# Patient Record
Sex: FEMALE | Race: WHITE | Employment: FULL TIME | URBAN - NONMETROPOLITAN AREA
[De-identification: names, ages, dates, MRNs, and addresses within clinical notes are randomized per-mention and may not be internally consistent; named-entity substitution may affect disease eponyms.]

---

## 2017-07-05 ENCOUNTER — TRANSFERRED RECORDS (OUTPATIENT)
Dept: HEALTH INFORMATION MANAGEMENT | Facility: HOSPITAL | Age: 37
End: 2017-07-05

## 2017-07-05 ENCOUNTER — HOSPITAL ENCOUNTER (INPATIENT)
Facility: HOSPITAL | Age: 37
LOS: 11 days | Discharge: HOME | DRG: 885 | End: 2017-07-16
Attending: PSYCHIATRY & NEUROLOGY | Admitting: PSYCHIATRY & NEUROLOGY
Payer: MEDICAID

## 2017-07-05 DIAGNOSIS — F43.10 PTSD (POST-TRAUMATIC STRESS DISORDER): Primary | ICD-10-CM

## 2017-07-05 LAB
ALT SERPL-CCNC: 17 U/L (ref 0–31)
AST SERPL-CCNC: 33 U/L (ref 0–31)
CREAT SERPL-MCNC: 0.6 MG/DL (ref 0.5–0.9)
GLUCOSE SERPL-MCNC: 90 MG/DL (ref 64–109)
POTASSIUM SERPL-SCNC: 4 MMOL/L (ref 3.5–5.1)
TSH SERPL-ACNC: 3.1 UIU/ML (ref 0.27–4)

## 2017-07-05 PROCEDURE — 12400000 ZZH R&B MH

## 2017-07-05 PROCEDURE — 25000132 ZZH RX MED GY IP 250 OP 250 PS 637: Performed by: NURSE PRACTITIONER

## 2017-07-05 RX ORDER — HYDROXYZINE HYDROCHLORIDE 25 MG/1
25-50 TABLET, FILM COATED ORAL EVERY 4 HOURS PRN
Status: DISCONTINUED | OUTPATIENT
Start: 2017-07-05 | End: 2017-07-09

## 2017-07-05 RX ORDER — OLANZAPINE 10 MG/2ML
10 INJECTION, POWDER, FOR SOLUTION INTRAMUSCULAR
Status: DISCONTINUED | OUTPATIENT
Start: 2017-07-05 | End: 2017-07-09

## 2017-07-05 RX ORDER — OLANZAPINE 10 MG/1
10 TABLET ORAL
Status: DISCONTINUED | OUTPATIENT
Start: 2017-07-05 | End: 2017-07-09

## 2017-07-05 RX ORDER — ACETAMINOPHEN 325 MG/1
650 TABLET ORAL EVERY 4 HOURS PRN
Status: DISCONTINUED | OUTPATIENT
Start: 2017-07-05 | End: 2017-07-16 | Stop reason: HOSPADM

## 2017-07-05 RX ORDER — DIAZEPAM 5 MG
5-20 TABLET ORAL EVERY 30 MIN PRN
Status: DISCONTINUED | OUTPATIENT
Start: 2017-07-05 | End: 2017-07-12

## 2017-07-05 RX ORDER — TRAZODONE HYDROCHLORIDE 50 MG/1
50 TABLET, FILM COATED ORAL
Status: DISCONTINUED | OUTPATIENT
Start: 2017-07-05 | End: 2017-07-10

## 2017-07-05 RX ORDER — HYDROXYZINE HYDROCHLORIDE 25 MG/1
TABLET, FILM COATED ORAL
Status: DISCONTINUED
Start: 2017-07-05 | End: 2017-07-08 | Stop reason: HOSPADM

## 2017-07-05 RX ADMIN — HYDROXYZINE HYDROCHLORIDE 50 MG: 25 TABLET ORAL at 23:23

## 2017-07-05 NOTE — IP AVS SNAPSHOT
[facility]    [address] [place] [address]    Phone:  [phone]    Fax:  [phone]                                       After Visit Summary   7/5/2017    Lorena Holden    MRN: [id number]           After Visit Summary Signature Page     I have received my discharge instructions, and my questions have been answered. I have discussed any challenges I see with this plan with the nurse or doctor.    ..........................................................................................................................................  Patient/Patient Representative Signature      ..........................................................................................................................................  Patient Representative Print Name and Relationship to Patient    ..................................................               ................................................  Date                                            Time    ..........................................................................................................................................  Reviewed by Signature/Title    ...................................................              ..............................................  Date                                                            Time

## 2017-07-05 NOTE — IP AVS SNAPSHOT
MRN:[id number]                      After Visit Summary   7/5/2017    Lorena Holden    MRN: [id number]           Thank you!     Thank you for choosing [facility] for your care. Our goal is always to provide you with excellent care. Hearing back from our patients is one way we can continue to improve our services. Please take a few minutes to complete the written survey that you may receive in the mail after you visit with us. Thank you!        Patient Information     Date Of Birth          1980        Designated Caregiver       Most Recent Value    Caregiver    Will someone help with your care after discharge? yes    Name of designated caregiver Lives with parents    Phone number of caregiver .    Caregiver address .      About your hospital stay     You were admitted on:  July 5, 2017 You last received care in the:  [facility]    You were discharged on:  July 16, 2017       Who to Call     For medical emergencies, please call 911.  For non-urgent questions about your medical care, please call your primary care provider or clinic, [phone]          Attending Provider     Provider Specialty    Asad Caban MD Psychiatry    Dee Dee Chu APRN CNP Nurse Practitioner       Primary Care Provider Office Phone # Fax #    Zenon Kennedy [phone]      Further instructions from your care team       Behavioral Discharge Planning and Instructions    Summary: Lorena was admitted for increased depression with suicidal ideation.    Main Diagnosis: Major depressive disorder, recurrent, severe, R/O Bipolar 2 disorder (family history), PTSD, Alcohol use disorder, moderate.     Major Treatments, Procedures and Findings: Stabilize with medications, connect with community programs.     Symptoms to Report: feeling more aggressive, increased confusion, losing more sleep, mood getting worse or thoughts of suicide    Lifestyle Adjustment: Take all medications as prescribed, meet  with doctor/ medication provider, out patient therapist, , and [facility] worker as scheduled. Abstain from alcohol or any unprescribed drugs.     Psychiatry Follow-up:     [facility] - referral sent 7/7/2017   Individual Therapy - July 19th, @ 1 pm.   [facility] - Therapist will give referral at appointment  Medication Management - Needs a primary doctor to give referral.  [address], [address]  Phone: [phone]  Fax: [phone]    [facility]   PCP - Zenon Schmidt - July 25th @ 10:20 (will need referral for psychiatry at the [facility])   Phone: [phone]  Fax: [phone]     [facility]   Rule 25 - August 1st @ 12:00 (two hour appointment)   [address] [address]   Phone: [phone]   Fax: [phone]    Resources:   Crisis Intervention: 136.359.2503 or 100-356-9251 (TTY: 113.293.1288).  Call anytime for help.  National [place] on Mental Illness (www.[place].araceli.org): 444.790.4087 or 223-438-0144.  Alcoholics Anonymous (www.alcoholics-anonymous.org): Check your phone book for your local chapter.  Suicide Awareness Voices of Education (SAVE) (www.save.org): 001-479-GBDJ (1183)  National Suicide Prevention Line (www.mentalhealthmn.org): 008-790-JZIF (0759)  Mental Health Consumer/Survivor Network of [place] (www.mhcsn.net): 662.257.7705 or 742-620-9487  Mental Health Association of [place] (www.mentalhealth.org): 598.889.2358 or 872-019-1919    General Medication Instructions:   See your medication sheet(s) for instructions.   Take all medicines as directed.  Make no changes unless your doctor suggests them.   Go to all your doctor visits.  Be sure to have all your required lab tests. This way, your medicines can be refilled on time.  Do not use any drugs not prescribed by your doctor.  Avoid alcohol.    [place] Area:  [facility] stabilization [facility]- [phone]  [facility] Crisis Line: 1-296.373.7258  Advocates For Family Peace:  "255-3060  Sexual Assault Program of [facility] Association: 471.378.4323 or 1-230.286.3050  [place] Battered Women's Program: 1-872.753.6600 Ext: 279       Calls answered Mon-Fri-8:00 am--4:30 pm    [place]:  Advocates for Family Peace: 1-565.117.8844  [facility] first call for help: 1-356.963.4447  [facility]:  (445) 445-9622      [place] Area:  Warm Line: 1-527.350.7247       Calls answered Tuesday--Saturday 4:00 pm--10:00 pm  [facility] Crisis Line - 272.114.4207  [place] [facility] 216-150-3211    [place] Statewide:  [place] Crisis and Referral Services: 1-512.520.2806  National Suicide Prevention Lifeline: 4-659-210-TALK (8532)   - byd7iyfc- Text  Life  to [address]  First Call for Help: 2-1-1  TEJIND[facility] Helpline- 5-804-NFIM-HELP     Pending Results     No orders found from 7/3/2017 to 7/6/2017.            Statement of Approval     Ordered          07/15/17 1050  I have reviewed and agree with all the recommendations and orders detailed in this document.  EFFECTIVE NOW     Approved and electronically signed by:  Dana Victoria NP             Admission Information     Date & Time Department Dept. Phone    7/5/2017 [facility] [phone]      Your Vitals Were     Blood Pressure Pulse Temperature Respirations Height Weight    120/77 65 97.5  F (36.4  C) (Tympanic) 12 1.702 m (5' 7\") 62.1 kg (137 lb)    Pulse Oximetry BMI (Body Mass Index)                98% 21.46 kg/m2          PlayEnable Information     PlayEnable lets you send messages to your doctor, view your test results, renew your prescriptions, schedule appointments and more. To sign up, go to [email or web address] . Click on \"Log in\" on the left side of the screen, which will take you to the Welcome page. Then click on \"Sign up Now\" on the right side of the page.     You will be asked to enter the access code listed below, as well as some personal information. Please follow the directions to create your username and " password.     Your access code is: [email or web address]  Expires: 10/13/2017 11:41 AM     Your access code will  in 90 days. If you need help or a new code, please call your [facility] clinic or [phone].        Care EveryWhere ID     This is your Care EveryWhere ID. This could be used by other organizations to access your [facility] medical records  [id number]        Equal Access to Services     ADRIÁN MITCHELL : Hadii virginia medina hadasho Soomaali, waaxda luqadaha, qaybta kaalmada adeegyada, riaz kaplan haymisaelmarissa paynemegansven felix . So [facility] [phone].    ATENCIÓN: Si habla espradha, tiene a daugherty disposición servicios gratuitos de asistencia lingüística. Llame al [phone].    We comply with applicable federal civil rights laws and [place] laws. We do not discriminate on the basis of race, color, national origin, age, disability sex, sexual orientation or gender identity.               Review of your medicines      START taking        Dose / Directions    busPIRone 5 MG tablet   Commonly known as:  BUSPAR   Used for:  PTSD (post-traumatic stress disorder)        Dose:  5 mg   Take 1 tablet (5 mg) by mouth 3 times daily   Quantity:  90 tablet   Refills:  0       DULoxetine 60 MG EC capsule   Commonly known as:  CYMBALTA   Used for:  PTSD (post-traumatic stress disorder)        Dose:  60 mg   Take 1 capsule (60 mg) by mouth daily   Quantity:  30 capsule   Refills:  0       hydrOXYzine 50 MG tablet   Commonly known as:  ATARAX   Used for:  PTSD (post-traumatic stress disorder)        Dose:  50 mg   Take 1 tablet (50 mg) by mouth 2 times daily   Quantity:  60 tablet   Refills:  0       naltrexone 50 MG tablet   Commonly known as:  DEPADE;REVIA   Used for:  PTSD (post-traumatic stress disorder)        Dose:  50 mg   Take 1 tablet (50 mg) by mouth daily   Quantity:  30 tablet   Refills:  0       prazosin 5 MG capsule   Commonly known as:  MINIPRESS   Used for:  PTSD (post-traumatic stress disorder)        Dose:  5 mg   Take  1 capsule (5 mg) by mouth At Bedtime   Quantity:  30 capsule   Refills:  0       traZODone 50 MG tablet   Commonly known as:  DESYREL   Used for:  PTSD (post-traumatic stress disorder)        Dose:   mg   Take 1-2 tablets ( mg) by mouth nightly as needed for sleep   Quantity:  60 tablet   Refills:  0         CONTINUE these medicines which have NOT CHANGED        Dose / Directions    fluticasone 50 MCG/ACT spray   Commonly known as:  FLONASE        Dose:  2 spray   Spray 2 sprays into both nostrils daily   Refills:  0       ondansetron 4 MG ODT tab   Commonly known as:  ZOFRAN-ODT   Used for:  Nausea & vomiting        Dose:  4-8 mg   Take 1-2 tablets (4-8 mg) by mouth every 8 hours as needed for nausea Dissolve ON the tongue.   Quantity:  4 tablet   Refills:  0       valACYclovir 500 MG tablet   Commonly known as:  VALTREX        Dose:  500 mg   Take 500 mg by mouth daily   Refills:  0         STOP taking     amphetamine-dextroamphetamine 20 MG per 24 hr capsule   Commonly known as:  ADDERALL XR           HYDROMORPHONE HCL PO           TEMAZEPAM PO                Where to get your medicines      These medications were sent to [facility] [address] [place] [address], [place] - [address] AT [facility] of [facility] [address]  [address], [facility] [address]     Phone:  [phone]     busPIRone 5 MG tablet    DULoxetine 60 MG EC capsule    hydrOXYzine 50 MG tablet    naltrexone 50 MG tablet    prazosin 5 MG capsule    traZODone 50 MG tablet                Protect others around you: Learn how to safely use, store and throw away your medicines at www.disposemymeds.org.             Medication List: This is a list of all your medications and when to take them. Check marks below indicate your daily home schedule. Keep this list as a reference.      Medications           Morning Afternoon Evening Bedtime As Needed    busPIRone 5 MG tablet   Commonly known as:  BUSPAR   Take 1 tablet (5 mg) by mouth 3 times daily   Last  time this was given:  5 mg on 7/16/2017 12:19 PM                                DULoxetine 60 MG EC capsule   Commonly known as:  CYMBALTA   Take 1 capsule (60 mg) by mouth daily   Last time this was given:  60 mg on 7/16/2017  8:26 AM                                fluticasone 50 MCG/ACT spray   Commonly known as:  FLONASE   Spray 2 sprays into both nostrils daily                                hydrOXYzine 50 MG tablet   Commonly known as:  ATARAX   Take 1 tablet (50 mg) by mouth 2 times daily   Last time this was given:  50 mg on 7/16/2017  8:27 AM                                naltrexone 50 MG tablet   Commonly known as:  DEPADE;REVIA   Take 1 tablet (50 mg) by mouth daily   Last time this was given:  50 mg on 7/16/2017  8:27 AM                                ondansetron 4 MG ODT tab   Commonly known as:  ZOFRAN-ODT   Take 1-2 tablets (4-8 mg) by mouth every 8 hours as needed for nausea Dissolve ON the tongue.   Last time this was given:  4 mg on 7/16/2017  7:52 AM                                prazosin 5 MG capsule   Commonly known as:  MINIPRESS   Take 1 capsule (5 mg) by mouth At Bedtime   Last time this was given:  5 mg on 7/15/2017  9:10 PM                                traZODone 50 MG tablet   Commonly known as:  DESYREL   Take 1-2 tablets ( mg) by mouth nightly as needed for sleep   Last time this was given:  100 mg on 7/15/2017  9:16 PM                                valACYclovir 500 MG tablet   Commonly known as:  VALTREX   Take 500 mg by mouth daily                                          More Information        Patient Education    Diazepam Oral solution    Diazepam Oral tablet    Diazepam Rectal gel    Diazepam Solution for injection  Diazepam Oral tablet  What is this medicine?  DIAZEPAM (dye AZ e virgilio) is a benzodiazepine. It is used to treat anxiety and nervousness. It also can help treat alcohol withdrawal, relax muscles, and treat certain types of seizures.  This medicine may be used for  other purposes; ask your health care provider or pharmacist if you have questions.  What should I tell my health care provider before I take this medicine?  They need to know if you have any of these conditions    an alcohol or drug abuse problem    bipolar disorder, depression, psychosis or other mental health condition    glaucoma    kidney or liver disease    lung or breathing disease    myasthenia gravis    Parkinson's disease    seizures or a history of seizures    suicidal thoughts    an unusual or allergic reaction to diazepam, other benzodiazepines, foods, dyes, or preservatives    pregnant or trying to get pregnant    breast-feeding  How should I use this medicine?  Take this medicine by mouth with a glass of water. Follow the directions on the prescription label. If this medicine upsets your stomach, take it with food or milk. Take your doses at regular intervals. Do not take your medicine more often than directed. If you have been taking this medicine regularly for some time, do not suddenly stop taking it. You must gradually reduce the dose or you may get severe side effects. Ask your doctor or health care professional for advice. Even after you stop taking this medicine it can still affect your body for several days.  Talk to your pediatrician regarding the use of this medicine in children. Special care may be needed.  Overdosage: If you think you have taken too much of this medicine contact a poison control center or emergency room at once.  NOTE: This medicine is only for you. Do not share this medicine with others.  What if I miss a dose?  If you miss a dose, take it as soon as you can. If it is almost time for your next dose, take only that dose. Do not take double or extra doses.  What may interact with this medicine?    cimetidine    grapefruit juice    herbal or dietary supplements like kava kava, melatonin, Wautec's Wort, or valerian    medicines for anxiety or sleeping problems, like  alprazolam, lorazepam, or triazolam    medicines for depression, mental problems or psychiatric disturbances    medicines for HIV infection or AIDS    prescription pain medicines    rifampin, rifapentine, or rifabutin    some medicines for seizures like carbamazepine, phenobarbital, phenytoin, or primidone  This list may not describe all possible interactions. Give your health care provider a list of all the medicines, herbs, non-prescription drugs, or dietary supplements you use. Also tell them if you smoke, drink alcohol, or use illegal drugs. Some items may interact with your medicine.  What should I watch for while using this medicine?  Visit your doctor or health care professional for regular checks on your progress. Your body can become dependent on this medicine. Ask your doctor or health care professional if you still need to take it.  You may get drowsy or dizzy. Do not drive, use machinery, or do anything that needs mental alertness until you know how this medicine affects you. To reduce the risk of dizzy and fainting spells, do not stand or sit up quickly, especially if you are an older patient. Alcohol may increase dizziness and drowsiness. Avoid alcoholic drinks.  Do not treat yourself for coughs, colds or allergies without asking your doctor or health care professional for advice. Some ingredients can increase possible side effects.  What side effects may I notice from receiving this medicine?  Side effects that you should report to your doctor or health care professional as soon as possible:    allergic reactions like skin rash, itching or hives, swelling of the face, lips, or tongue    angry, confused, depressed, other mood changes    breathing problems    feeling faint or lightheaded, falls    muscle cramps    problems with balance, talking, walking    restlessness    tremors    trouble passing urine or change in the amount of urine    unusually weak or tired  Side effects that usually do not  require medical attention (report to your doctor or health care professional if they continue or are bothersome):    difficulty sleeping, nightmares    dizziness, drowsiness, clumsiness, or unsteadiness, a hangover effect    headache    nausea, vomiting  This list may not describe all possible side effects. Call your doctor for medical advice about side effects. You may report side effects to FDA at 6-699-MJF-4799.  Where should I keep my medicine?  Keep out of the reach of children. This medicine can be abused. Keep your medicine in a safe place to protect it from theft. Do not share this medicine with anyone. Selling or giving away this medicine is dangerous and against the law.  Store at room temperature between 15 and 30 degrees C (59 and 86 degrees F). Protect from light. Keep container tightly closed. Throw away any unused medicine after the expiration date.  NOTE:This sheet is a summary. It may not cover all possible information. If you have questions about this medicine, talk to your doctor, pharmacist, or health care provider. Copyright  2016 [employer]                Buspirone tablets  What is this medicine?  BUSPIRONE (byoo HOANG mahane) is used to treat anxiety disorders.  How should I use this medicine?  Take this medicine by mouth with a glass of water. Follow the directions on the prescription label. You may take this medicine with or without food. To ensure that this medicine always works the same way for you, you should take it either always with or always without food. Take your doses at regular intervals. Do not take your medicine more often than directed. Do not stop taking except on the advice of your doctor or health care professional.  Talk to your pediatrician regarding the use of this medicine in children. Special care may be needed.  What side effects may I notice from receiving this medicine?  Side effects that you should report to your doctor or health care professional as soon as  possible:    blurred vision or other vision changes    chest pain    confusion    difficulty breathing    feelings of hostility or anger    muscle aches and pains    numbness or tingling in hands or feet    ringing in the ears    skin rash and itching    vomiting    weakness  Side effects that usually do not require medical attention (report to your doctor or health care professional if they continue or are bothersome):    disturbed dreams, nightmares    headache    nausea    restlessness or nervousness    sore throat and nasal congestion    stomach upset  What may interact with this medicine?  Do not take this medicine with any of the following medications:    linezolid    MAOIs like Carbex, Eldepryl, Marplan, Nardil, and Parnate    methylene blue    procarbazine  This medicine may also interact with the following medications:    diazepam    digoxin    diltiazem    erythromycin    grapefruit juice    haloperidol    medicines for mental depression or mood problems    medicines for seizures like carbamazepine, phenobarbital and phenytoin    nefazodone    other medications for anxiety    rifampin    ritonavir    some antifungal medicines like itraconazole, ketoconazole, and voriconazole    verapamil    warfarin  What if I miss a dose?  If you miss a dose, take it as soon as you can. If it is almost time for your next dose, take only that dose. Do not take double or extra doses.  Where should I keep my medicine?  Keep out of the reach of children.  Store at room temperature below 30 degrees C (86 degrees F). Protect from light. Keep container tightly closed. Throw away any unused medicine after the expiration date.  What should I tell my health care provider before I take this medicine?  They need to know if you have any of these conditions:    kidney or liver disease    an unusual or allergic reaction to buspirone, other medicines, foods, dyes, or preservatives    pregnant or trying to get  pregnant    breast-feeding  What should I watch for while using this medicine?  Visit your doctor or health care professional for regular checks on your progress. It may take 1 to 2 weeks before your anxiety gets better.  You may get drowsy or dizzy. Do not drive, use machinery, or do anything that needs mental alertness until you know how this drug affects you. Do not stand or sit up quickly, especially if you are an older patient. This reduces the risk of dizzy or fainting spells. Alcohol can make you more drowsy and dizzy. Avoid alcoholic drinks.  NOTE:This sheet is a summary. It may not cover all possible information. If you have questions about this medicine, talk to your doctor, pharmacist, or health care provider. Copyright  2017 [employer] Standard

## 2017-07-06 PROCEDURE — 12400000 ZZH R&B MH

## 2017-07-06 PROCEDURE — 25000132 ZZH RX MED GY IP 250 OP 250 PS 637: Performed by: NURSE PRACTITIONER

## 2017-07-06 PROCEDURE — 99223 1ST HOSP IP/OBS HIGH 75: CPT | Performed by: NURSE PRACTITIONER

## 2017-07-06 RX ORDER — ALBUTEROL SULFATE 90 UG/1
2 AEROSOL, METERED RESPIRATORY (INHALATION) EVERY 6 HOURS PRN
Status: DISCONTINUED | OUTPATIENT
Start: 2017-07-06 | End: 2017-07-16 | Stop reason: HOSPADM

## 2017-07-06 RX ORDER — DULOXETIN HYDROCHLORIDE 30 MG/1
30 CAPSULE, DELAYED RELEASE ORAL DAILY
Status: DISCONTINUED | OUTPATIENT
Start: 2017-07-07 | End: 2017-07-10

## 2017-07-06 RX ORDER — VALACYCLOVIR HYDROCHLORIDE 500 MG/1
500 TABLET, FILM COATED ORAL DAILY
COMMUNITY
End: 2018-03-15

## 2017-07-06 RX ORDER — METAXALONE 800 MG/1
800 TABLET ORAL 3 TIMES DAILY PRN
Status: DISCONTINUED | OUTPATIENT
Start: 2017-07-06 | End: 2017-07-16 | Stop reason: HOSPADM

## 2017-07-06 RX ORDER — IBUPROFEN 800 MG/1
800 TABLET, FILM COATED ORAL EVERY 6 HOURS PRN
Status: DISCONTINUED | OUTPATIENT
Start: 2017-07-06 | End: 2017-07-16 | Stop reason: HOSPADM

## 2017-07-06 RX ORDER — PRAZOSIN HYDROCHLORIDE 1 MG/1
1 CAPSULE ORAL AT BEDTIME
Status: DISCONTINUED | OUTPATIENT
Start: 2017-07-06 | End: 2017-07-07

## 2017-07-06 RX ORDER — DEXTROAMPHETAMINE SACCHARATE, AMPHETAMINE ASPARTATE MONOHYDRATE, DEXTROAMPHETAMINE SULFATE AND AMPHETAMINE SULFATE 5; 5; 5; 5 MG/1; MG/1; MG/1; MG/1
20 CAPSULE, EXTENDED RELEASE ORAL DAILY
Status: ON HOLD | COMMUNITY
End: 2017-07-15

## 2017-07-06 RX ORDER — NALTREXONE HYDROCHLORIDE 50 MG/1
50 TABLET, FILM COATED ORAL DAILY
Status: DISCONTINUED | OUTPATIENT
Start: 2017-07-06 | End: 2017-07-16 | Stop reason: HOSPADM

## 2017-07-06 RX ORDER — ONDANSETRON 4 MG/1
4 TABLET, ORALLY DISINTEGRATING ORAL EVERY 6 HOURS PRN
Status: DISCONTINUED | OUTPATIENT
Start: 2017-07-06 | End: 2017-07-16 | Stop reason: HOSPADM

## 2017-07-06 RX ADMIN — PRAZOSIN HYDROCHLORIDE 1 MG: 1 CAPSULE ORAL at 20:18

## 2017-07-06 RX ADMIN — DIAZEPAM 10 MG: 5 TABLET ORAL at 00:30

## 2017-07-06 RX ADMIN — IBUPROFEN 800 MG: 800 TABLET ORAL at 20:59

## 2017-07-06 RX ADMIN — OLANZAPINE 10 MG: 10 TABLET, FILM COATED ORAL at 22:15

## 2017-07-06 RX ADMIN — NALTREXONE HYDROCHLORIDE 50 MG: 50 TABLET, FILM COATED ORAL at 18:43

## 2017-07-06 RX ADMIN — DIAZEPAM 10 MG: 5 TABLET ORAL at 13:11

## 2017-07-06 RX ADMIN — DIAZEPAM 10 MG: 5 TABLET ORAL at 16:05

## 2017-07-06 RX ADMIN — HYDROXYZINE HYDROCHLORIDE 50 MG: 25 TABLET ORAL at 20:18

## 2017-07-06 RX ADMIN — DIAZEPAM 10 MG: 5 TABLET ORAL at 23:34

## 2017-07-06 ASSESSMENT — ACTIVITIES OF DAILY LIVING (ADL)
DRESS: 0-->INDEPENDENT
SWALLOWING: 0-->SWALLOWS FOODS/LIQUIDS WITHOUT DIFFICULTY
EATING: 0-->INDEPENDENT
DRESS: 0-->INDEPENDENT
ORAL_HYGIENE: INDEPENDENT
RETIRED_EATING: 0-->INDEPENDENT
PRIOR_FUNCTIONAL_LEVEL_COMMENT: NONE NOTED
COGNITION: 0 - NO COGNITION ISSUES REPORTED
SWALLOWING: 0-->SWALLOWS FOODS/LIQUIDS WITHOUT DIFFICULTY
TOILETING: 0-->INDEPENDENT
TRANSFERRING: 0-->INDEPENDENT
TRANSFERRING: 0-->INDEPENDENT
AMBULATION: 0-->INDEPENDENT
AMBULATION: 0-->INDEPENDENT
CURRENT_FUNCTIONAL_LEVEL_COMMENT: NONE NOTED
BATHING: 0-->INDEPENDENT
FALL_HISTORY_WITHIN_LAST_SIX_MONTHS: NO
BATHING: 0-->INDEPENDENT
DRESS: INDEPENDENT;SCRUBS (BEHAVIORAL HEALTH)
GROOMING: INDEPENDENT;SHOWER
CHANGE_IN_FUNCTIONAL_STATUS_SINCE_ONSET_OF_CURRENT_ILLNESS/INJURY: NO
COMMUNICATION: 0-->UNDERSTANDS/COMMUNICATES WITHOUT DIFFICULTY
RETIRED_COMMUNICATION: 0-->UNDERSTANDS/COMMUNICATES WITHOUT DIFFICULTY
LAUNDRY: UNABLE TO COMPLETE
TOILETING: 0-->INDEPENDENT
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
## 2017-07-06 NOTE — PLAN OF CARE
Face to face end of shift report received from Lilo LANZA RN. Rounding completed. Patient observed sitting in lounge area. No requests at this time.     Smiley Marquez  7/6/2017  12:21 AM

## 2017-07-06 NOTE — PLAN OF CARE
Problem: General Plan of Care (Inpatient Behavioral)  Goal: Patient Schedule  Patient s Schedule:   Pt has order for OT. Checked in with pt this afternoon and will plan to assess for OT tomorrow.

## 2017-07-06 NOTE — PLAN OF CARE
ADMISSION NOTE    Reason for admission: Suicidal Ideation and increased depression  Safety concerns: none  Risk for or history of violence: none   Full skin assessment: scar on lower mid back    Patient arrived on unit from [facility] accompanied by [place] EMT on 7/5/2017  10:35 PM.   Status on arrival: tearful, anxious, cooperative  /92  Pulse 83  Temp 98.9  F (37.2  C) (Tympanic)  Resp 22  SpO2 98%  Patient given tour of unit and Welcome to  unit papers given to patient, wanding completed, belongings inventoried, and admission assessment completed.   Patient's legal status on arrival is [place]. Appropriate legal rights discussed with and copy given to patient. Patient Bill of Rights discussed with and copy given to patient.   Patient denies SI, HI, and thoughts of self harm and contracts for safety while on unit.      Lilo La  7/5/2017  11:15 PM        Pt transported via ambulance on stretcher. Pt cooperative but anxious and tearful. Patient does have scattered tattoos throughout body and a scar on lower mid back from colon resection. Pt talks about depression and SI increasing throughout the past week. Pt states she is scared as she has never been in an inpatient behavioral health unit. Pt reports worsening nightmares and poor sleep. Pt states she drinks a large amount daily and becomes shaky each morning until she drinks again. Pt denies other drug use.

## 2017-07-06 NOTE — PLAN OF CARE
Problem: General Plan of Care (Inpatient Behavioral)  Goal: Team Discussion  Team Plan:   BEHAVIORAL TEAM DISCUSSION     Participants: Dana Victoria NP, Danae Lawton NP, [facility]as [facility], [facility], Sarita Jeffery [facility], Keturah Azul Discharge Planner, Salma Cifuentes RN, Donis Mcclendon RN, Kristan Loyd OT, Kimberlyn Bhat OT   Progress: None  Continued Stay Criteria/Rationale: Suicidal Ideation, minimal coping abilities, PTSD symptoms  Medical/Physical: Pain, monitor for ETOH withdrawal  Precautions:   Behavioral Orders   Procedures     Code 1 - Restrict to Unit     Routine Programming       As clinically indicated     Status 15       Every 15 minutes.     Plan: Restart medications. Safe alcohol withdrawal.   Rationale for change in precautions or plan: None

## 2017-07-06 NOTE — PLAN OF CARE
Problem: General Plan of Care (Inpatient Behavioral)  Goal: Individualization/Patient Specific Goal (IP Behavioral)  The patient and/or their representative will achieve their patient-specific goals related to the plan of care.    The patient-specific goals include:   Patient will verbalize a decrease in suicidal ideation prior to discharge.   Patient will contract for safety if having thoughts of self harm.   Patient will verbalize an acceptable level of pain while on unit.   Patient will have a safe withdrawal from alcohol.   Patient will comply with treatment team recommendations while on unit.   Patient sitting in lounge area at beginning of shift. Reports not eating all day. Meal brought up and patient did not eat any at this time. Slight tremors and sweating noted during conversation. MSSA score in beginning of shift was 12.   1230- Received PRN Valium 10 mg.   Patient laying in bed with eyes closed, non-labored breathing for remainder of shift. Position changes noted.  MSSA score at 0400 was 3 d/t patient sleeping.

## 2017-07-06 NOTE — PLAN OF CARE
Problem: General Plan of Care (Inpatient Behavioral)  Goal: Individualization/Patient Specific Goal (IP Behavioral)  The patient and/or their representative will achieve their patient-specific goals related to the plan of care.    The patient-specific goals include:   Patient will verbalize a decrease in suicidal ideation prior to discharge.   Patient will contract for safety if having thoughts of self harm.   Patient will verbalize an acceptable level of pain while on unit.   Patient will have a safe withdrawal from alcohol.   Patient will comply with treatment team recommendations while on unit.   Outcome: No Change  Pt contracts for safety. Pt isolates to her room, she is withdrawn. Pt appears to be irritable when talking with her. Pt reports pain in colon area due to what she states is her endometriosis. Pt declines tylenol stating that due to poor appetite and fear of upsetting her stomach. Pt is  Calm and cooperative she is asking for pain medications. Pt states that she received Percocet and morphine yesterday in the hospital and she reports this did help decrease her pain. Pt did talk with NP regarding pain.   Pt states she is feeling anxious like she is crawling out of her skin. Pt was administered 10mg of valium at 1605 for a MSSA score of 14.  Pt irritable and c/o pain frequently, and agitation with other patients. pt states the only thing that helps is percocet. Pt offered Tylenol again but refused, pt was administered Visterol 50mg for anxiety. Pt reported this was not helpful. Pt did get a roommate which she states makes her scared and upset she asks if there is any way she can have her own room. Pt was encouraged to use coping skills. Pt c/o and was administered ibuprofen at 2059 to assist with pain. Pt continues to have anxiety stating that a patient that is in the hallway singing and a roommate, and the patients being loud is increasing her anxiety pt states she is unable to cope. Pt administered  Zyprexa 10mg at 2215 for agitation, anxiety, and pain. Pt then c/o that after 20 minutes this medication made her feel like she was crawling out of her skin, pt states she just needs an alcoholic beverage and she feels like she is withdrawing. Pt was assessed and scored a 9 pt given 10mg of Valium at 2334. Pt is currently in bed with her eyes closed.

## 2017-07-06 NOTE — PLAN OF CARE
Face to face end of shift report received from LYSSA Reis. Rounding completed. Patient observed in room.      Donis Mcclendon  7/6/2017  7:30 AM
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
## 2017-07-06 NOTE — PLAN OF CARE
Face to face end of shift report received from Donis HOBSON RN. Rounding completed. Patient observed in room with staff.     Lilo La  7/6/2017  3:48 PM

## 2017-07-07 PROCEDURE — 25000132 ZZH RX MED GY IP 250 OP 250 PS 637: Performed by: NURSE PRACTITIONER

## 2017-07-07 PROCEDURE — 99232 SBSQ HOSP IP/OBS MODERATE 35: CPT | Performed by: NURSE PRACTITIONER

## 2017-07-07 PROCEDURE — 40000133 ZZH STATISTIC OT WARD VISIT

## 2017-07-07 PROCEDURE — 25000125 ZZHC RX 250: Performed by: NURSE PRACTITIONER

## 2017-07-07 PROCEDURE — 12400000 ZZH R&B MH

## 2017-07-07 PROCEDURE — 97165 OT EVAL LOW COMPLEX 30 MIN: CPT | Mod: GO

## 2017-07-07 RX ORDER — PRAZOSIN HYDROCHLORIDE 1 MG/1
2 CAPSULE ORAL AT BEDTIME
Status: DISCONTINUED | OUTPATIENT
Start: 2017-07-07 | End: 2017-07-10

## 2017-07-07 RX ADMIN — PRAZOSIN HYDROCHLORIDE 2 MG: 1 CAPSULE ORAL at 20:32

## 2017-07-07 RX ADMIN — ONDANSETRON 4 MG: 4 TABLET, ORALLY DISINTEGRATING ORAL at 13:05

## 2017-07-07 RX ADMIN — DULOXETINE HYDROCHLORIDE 30 MG: 30 CAPSULE, DELAYED RELEASE ORAL at 08:14

## 2017-07-07 RX ADMIN — NALTREXONE HYDROCHLORIDE 50 MG: 50 TABLET, FILM COATED ORAL at 08:14

## 2017-07-07 RX ADMIN — HYDROXYZINE HYDROCHLORIDE 50 MG: 25 TABLET ORAL at 17:00

## 2017-07-07 RX ADMIN — METAXALONE 800 MG: 800 TABLET ORAL at 11:50

## 2017-07-07 RX ADMIN — METAXALONE 800 MG: 800 TABLET ORAL at 20:32

## 2017-07-07 RX ADMIN — DIAZEPAM 10 MG: 5 TABLET ORAL at 08:14

## 2017-07-07 RX ADMIN — HYDROXYZINE HYDROCHLORIDE 50 MG: 25 TABLET ORAL at 11:50

## 2017-07-07 ASSESSMENT — ACTIVITIES OF DAILY LIVING (ADL)
LAUNDRY: UNABLE TO COMPLETE
GROOMING: INDEPENDENT
DRESS: SCRUBS (BEHAVIORAL HEALTH);INDEPENDENT
ORAL_HYGIENE: INDEPENDENT
DRESS: SCRUBS (BEHAVIORAL HEALTH);INDEPENDENT
LAUNDRY: UNABLE TO COMPLETE
GROOMING: INDEPENDENT
ORAL_HYGIENE: INDEPENDENT
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
## 2017-07-07 NOTE — PLAN OF CARE
Face to face end of shift report received from Alejandra ELLIOTT RN. Rounding completed. Patient observed in bed awake.     Sofia Johnson  7/7/2017  3:36 PM

## 2017-07-07 NOTE — PLAN OF CARE
Problem: General Plan of Care (Inpatient Behavioral)  Goal: Individualization/Patient Specific Goal (IP Behavioral)  The patient and/or their representative will achieve their patient-specific goals related to the plan of care.    The patient-specific goals include:   Patient will verbalize a decrease in suicidal ideation prior to discharge.   Patient will contract for safety if having thoughts of self harm.   Patient will verbalize an acceptable level of pain while on unit.   Patient will have a safe withdrawal from alcohol.   Patient will comply with treatment team recommendations while on unit.   Outcome: No Change  Patient appeared to be sleeping all night, eyes were closed with normal non labored respirations.  Position changes were noted.

## 2017-07-07 NOTE — PLAN OF CARE
"Problem: General Plan of Care (Inpatient Behavioral)  Goal: Individualization/Patient Specific Goal (IP Behavioral)  The patient and/or their representative will achieve their patient-specific goals related to the plan of care.    The patient-specific goals include:   Patient will verbalize a decrease in suicidal ideation prior to discharge.   Patient will contract for safety if having thoughts of self harm.   Patient will verbalize an acceptable level of pain while on unit.   Patient will have a safe withdrawal from alcohol.   Patient will comply with treatment team recommendations while on unit.   Pt cooperative with assessment. She denies SI, HI, and hallucinations. Pt endorses 8/10 pain in her \"colon\". Pt did receive Skelaxin earlier for pain and does appear to be resting. Admits to anxiety 8/10 and depression that has always been there.Pt states she struggles with social anxiety. Encouraged to attend groups. Pt has been resting in bed for most of this shift. Scored 4 on MSSA at 1600. Pt has been free from self harm this shift.  1700- Administered 50 mg Atarax for anxiety. Will continue to monitor.  2032-Skelaxin administered for 8/10 pain.            "

## 2017-07-07 NOTE — PROGRESS NOTES
"[facility]  Psychiatric Progress Note      Impression:   This is a 36 year old yo female with a history of depression, PTSD, and alcohol abuse.    Lorena is laying in bed when I go to see her today. She tells me that she is doing \"just okay today\". States that she has been having \"colon pain\", though states that this has been ongoing for several years. She reports that the pain went away this morning and she was able to attend groups. Yesterday she had asked for Percocet, stating that this is the only thing that would help. I did tell her that I would not be ordering any of the controlled substances that she had been taking prior to admission. She does understand this and does want to get into a treatment facility. She worries that if she discharges back to her parents place that she will just fall back into the same pattern of drinking. She has been going through some mild alcohol withdrawal since admission and has received Valium per [facility] protocol. She does state that she slept well last night. She has been tolerating the new medications, denies any notable side effects. Will be working with OT on coping skills and pain management.       Educated regarding medication indications, risks, benefits, side effects, contraindications and possible interactions. Verbally expressed understanding.        DIagnoses:   Major depressive disorder, recurrent, severe  R/O Bipolar 2 disorder (family history)  PTSD  Alcohol use disorder, moderate      Attestation:  Patient has been seen and evaluated by me,  Dana Victoria NP          Interim History:   The patient's care was discussed with the treatment team and chart notes were reviewed.          Medications:     Current Facility-Administered Medications Ordered in Epic   Medication Dose Route Frequency Last Rate Last Dose     prazosin (MINIPRESS) capsule 2 mg  2 mg Oral At Bedtime         albuterol (PROAIR HFA/PROVENTIL HFA/VENTOLIN HFA) Inhaler 2 puff  2 puff " "Inhalation Q6H PRN         naltrexone (DEPADE;REVIA) tablet 50 mg  50 mg Oral Daily   50 mg at 07/07/17 0814     DULoxetine (CYMBALTA) EC capsule 30 mg  30 mg Oral Daily   30 mg at 07/07/17 0814     metaxalone (SKELAXIN) tablet 800 mg  800 mg Oral TID PRN   800 mg at 07/07/17 1150     ibuprofen (ADVIL/MOTRIN) tablet 800 mg  800 mg Oral Q6H PRN   800 mg at 07/06/17 2059     ondansetron (ZOFRAN-ODT) ODT tab 4 mg  4 mg Oral Q6H PRN   4 mg at 07/07/17 1305     hydrOXYzine (ATARAX) tablet 25-50 mg  25-50 mg Oral Q4H PRN   50 mg at 07/07/17 1150     acetaminophen (TYLENOL) tablet 650 mg  650 mg Oral Q4H PRN         traZODone (DESYREL) tablet 50 mg  50 mg Oral At Bedtime PRN         OLANZapine (zyPREXA) tablet 10 mg  10 mg Oral Q2H PRN   10 mg at 07/06/17 2215    Or     OLANZapine (zyPREXA) injection 10 mg  10 mg Intramuscular Q2H PRN         nicotine polacrilex (NICORETTE) gum 2-4 mg  2-4 mg Buccal Q1H PRN         diazepam (VALIUM) tablet 5-20 mg  5-20 mg Oral Q30 Min PRN   10 mg at 07/07/17 0814     No current Epic-ordered outpatient prescriptions on file.              10 point ROS reviewed- reports \"colon\" pain       Allergies:     Allergies   Allergen Reactions     Compazine [Prochlorperazine] Anaphylaxis     Edisylate/Maleate     Bee Venom             Psychiatric Examination:   /80  Pulse 68  Temp 97.6  F (36.4  C) (Tympanic)  Resp 16  Ht 1.702 m (5' 7\")  Wt 63.2 kg (139 lb 4.8 oz)  SpO2 98%  BMI 21.82 kg/m2  Weight is 139 lbs 4.8 oz  Body mass index is 21.82 kg/(m^2).    Appearance:  awake, alert, adequately groomed and dressed in hospital scrubs  Attitude:  cooperative  Eye Contact:  good  Mood:  anxious  Affect:  appropriate and in normal range  Speech:  clear, coherent  Psychomotor Behavior:  no evidence of tardive dyskinesia, dystonia, or tics  Thought Process:  logical, linear and goal oriented  Associations:  no loose associations  Thought Content:  no evidence of suicidal ideation or homicidal " ideation, no evidence of psychotic thought and no auditory hallucinations present  Insight:  fair  Judgment:  fair  Oriented to:  time, person, and place  Attention Span and Concentration:  fair  Recent and Remote Memory:  fair  Fund of Knowledge: appropriate  Muscle Strength and Tone: normal  Gait and Station: Normal           Labs:   No results found for this or any previous visit (from the past 24 hour(s)).           Plan:   Increase Prazosin to 2 mg at bedtime  Monitor for alcohol withdrawal

## 2017-07-07 NOTE — PLAN OF CARE
Face to face end of shift report received from LYSSA Nagy. Rounding completed. Patient observed in room laying in bed.     Helen Willson  7/7/2017  12:40 AM

## 2017-07-07 NOTE — PLAN OF CARE
Face to face end of shift report received from Helen Willson RN. Rounding completed. Patient observed in bed. Eyes closed and non-labored respirations noted. Will continue to monitor.     Alejandra Bradshaw  7/7/2017  7:32 AM

## 2017-07-07 NOTE — PLAN OF CARE
Problem: General Plan of Care (Inpatient Behavioral)  Goal: Patient Schedule  Patient s Schedule:   OT sharee completed on this date.  SLUMS completed and pt scored 17/30 indicating dementia for less than high school education.  Pt does report that she often feels foggy and out of it, with decrease concentration and that the meds she is on right now is making her feel that way again.  Pt is wanting to go to treatment for alcohol use.  At this time, OT recommends pt get home med nurse and rep payee for finances.  Pt was issued aromatherapy for pain and anxiety, peppermint and lavender for inhalation. Will continue with OT 3xwk to address cognition, coping skills and pain management.

## 2017-07-07 NOTE — PLAN OF CARE
Problem: General Plan of Care (Inpatient Behavioral)  Goal: Team Discussion  Team Plan:   BEHAVIORAL TEAM DISCUSSION     Participants: Debbie Dunn NP, Dana Victoria NP, Danae Lawton NP, Keturah Azul Discharge Planner, Salma Cifuentes RN, Alejandra Bradshaw RN, Kimberlyn Bhat OT      Progress: active up in the morning  Continued Stay Criteria/Rationale: Med adjustments  Medical/Physical: endometriosis, monitoring for alcohol withdrawal  Precautions:   Behavioral Orders   Procedures     Code 1 - Restrict to Unit     Routine Programming       As clinically indicated     Status 15       Every 15 minutes.     Plan: continue to stabilize on meds, get set up with services for after discharge.  Rationale for change in precautions or plan: None

## 2017-07-07 NOTE — PLAN OF CARE
"Problem: General Plan of Care (Inpatient Behavioral)  Goal: Individualization/Patient Specific Goal (IP Behavioral)  The patient and/or their representative will achieve their patient-specific goals related to the plan of care.    The patient-specific goals include:   Patient will verbalize a decrease in suicidal ideation prior to discharge.   Patient will contract for safety if having thoughts of self harm.   Patient will verbalize an acceptable level of pain while on unit.   Patient will have a safe withdrawal from alcohol.   Patient will comply with treatment team recommendations while on unit.   Patient is cooperative with this writer during nursing assessment. Pt denies having suicidal ideation, homicidal ideation, or hallucinations. Pt does report having anxiety and depression. Pt reports that anxiety and depression is always there since she was young. Pt did take a shower this morning. Pt encouraged to attend groups. Pt did attend groups with some prompting even after endorsing she struggles with social anxiety. Pt scored an 9 on MSSA at 0815. Pt received PRN valium 10 mg PO for withdrawal symptoms. Pt reassessed at 1145. Pt scored a 4 on MSSA and did not qualify for valium PRN. Pt did request PRN atarax 50 mg PO for anxiety. Pt did report a decrease in anxiety upon reassessment. Pt also received PRN skelaxin 800 mg PO at 1150 for pain in colon. Pt rated pain an \"8\" on a 0-10 numeric scale. An absences of verbal cues of pain noted. Pt did report a decrease in pain also. Pt had no SIB thus far. Pt has no questions or concerns regarding plan of care. She is able to make needs known. Will continue to monitor.   1305: Pt has complaints of nausea. Pt requested PRN. PRN zofran 4 mg PO taken by patient. Pt did report absence of nausea upon reassessment.       "

## 2017-07-08 PROCEDURE — 12400000 ZZH R&B MH

## 2017-07-08 PROCEDURE — 25000132 ZZH RX MED GY IP 250 OP 250 PS 637: Performed by: NURSE PRACTITIONER

## 2017-07-08 PROCEDURE — 25000125 ZZHC RX 250: Performed by: NURSE PRACTITIONER

## 2017-07-08 PROCEDURE — 99232 SBSQ HOSP IP/OBS MODERATE 35: CPT | Performed by: NURSE PRACTITIONER

## 2017-07-08 RX ADMIN — ONDANSETRON 4 MG: 4 TABLET, ORALLY DISINTEGRATING ORAL at 05:58

## 2017-07-08 RX ADMIN — ONDANSETRON 4 MG: 4 TABLET, ORALLY DISINTEGRATING ORAL at 13:02

## 2017-07-08 RX ADMIN — OLANZAPINE 10 MG: 10 TABLET, FILM COATED ORAL at 15:18

## 2017-07-08 RX ADMIN — PRAZOSIN HYDROCHLORIDE 2 MG: 1 CAPSULE ORAL at 20:52

## 2017-07-08 RX ADMIN — OLANZAPINE 10 MG: 10 TABLET, FILM COATED ORAL at 22:13

## 2017-07-08 RX ADMIN — DIAZEPAM 10 MG: 5 TABLET ORAL at 13:00

## 2017-07-08 RX ADMIN — METAXALONE 800 MG: 800 TABLET ORAL at 16:18

## 2017-07-08 RX ADMIN — ACETAMINOPHEN 650 MG: 325 TABLET, FILM COATED ORAL at 13:30

## 2017-07-08 RX ADMIN — DIAZEPAM 10 MG: 5 TABLET ORAL at 08:31

## 2017-07-08 RX ADMIN — DIAZEPAM 5 MG: 5 TABLET ORAL at 10:03

## 2017-07-08 RX ADMIN — DULOXETINE HYDROCHLORIDE 30 MG: 30 CAPSULE, DELAYED RELEASE ORAL at 08:33

## 2017-07-08 RX ADMIN — NALTREXONE HYDROCHLORIDE 50 MG: 50 TABLET, FILM COATED ORAL at 08:33

## 2017-07-08 RX ADMIN — METAXALONE 800 MG: 800 TABLET ORAL at 05:52

## 2017-07-08 RX ADMIN — IBUPROFEN 800 MG: 800 TABLET ORAL at 22:13

## 2017-07-08 RX ADMIN — DIAZEPAM 10 MG: 5 TABLET ORAL at 16:18

## 2017-07-08 ASSESSMENT — ACTIVITIES OF DAILY LIVING (ADL)
ORAL_HYGIENE: INDEPENDENT
LAUNDRY: UNABLE TO COMPLETE
GROOMING: INDEPENDENT
DRESS: SCRUBS (BEHAVIORAL HEALTH);INDEPENDENT
DRESS: SCRUBS (BEHAVIORAL HEALTH);INDEPENDENT
GROOMING: INDEPENDENT
ORAL_HYGIENE: INDEPENDENT

## 2017-07-08 NOTE — PLAN OF CARE
Problem: General Plan of Care (Inpatient Behavioral)  Goal: Individualization/Patient Specific Goal (IP Behavioral)  The patient and/or their representative will achieve their patient-specific goals related to the plan of care.    The patient-specific goals include:   Patient will verbalize a decrease in suicidal ideation prior to discharge.   Patient will contract for safety if having thoughts of self harm.   Patient will verbalize an acceptable level of pain while on unit.   Patient will have a safe withdrawal from alcohol.   Patient will comply with treatment team recommendations while on unit.   Outcome: No Change  0600- Pt reported feeling nauseas and having pain. Pt received PRN Zofran at 0558  and Skelaxin at 0552. Pt scored a 5 on the MSSA at this time.

## 2017-07-08 NOTE — PLAN OF CARE
Face to face end of shift report received from MYLA Lowry RN. Rounding completed. Patient observed, resting in bed with eyes closed.     Girish Mancera  7/8/2017  7:54 AM

## 2017-07-08 NOTE — PLAN OF CARE
Face to face end of shift report received from Sofia GALICIA RN. Rounding completed. Patient observed resting in bed.     John Lowry  7/7/2017  11:47 PM

## 2017-07-08 NOTE — PLAN OF CARE
Face to face end of shift report received from Girish LANZA RN. Rounding completed. Patient observed in room.     Sofia Romobismark  7/8/2017  4:01 PM
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
## 2017-07-09 LAB — GLUCOSE BLDC GLUCOMTR-MCNC: 134 MG/DL (ref 70–99)

## 2017-07-09 PROCEDURE — 00000146 ZZHCL STATISTIC GLUCOSE BY METER IP

## 2017-07-09 PROCEDURE — 25000132 ZZH RX MED GY IP 250 OP 250 PS 637: Performed by: NURSE PRACTITIONER

## 2017-07-09 PROCEDURE — 12400000 ZZH R&B MH

## 2017-07-09 PROCEDURE — 99232 SBSQ HOSP IP/OBS MODERATE 35: CPT | Performed by: NURSE PRACTITIONER

## 2017-07-09 RX ORDER — HYDROXYZINE HYDROCHLORIDE 25 MG/1
50-100 TABLET, FILM COATED ORAL EVERY 4 HOURS PRN
Status: DISCONTINUED | OUTPATIENT
Start: 2017-07-09 | End: 2017-07-14

## 2017-07-09 RX ADMIN — PRAZOSIN HYDROCHLORIDE 2 MG: 1 CAPSULE ORAL at 20:46

## 2017-07-09 RX ADMIN — NALTREXONE HYDROCHLORIDE 50 MG: 50 TABLET, FILM COATED ORAL at 08:17

## 2017-07-09 RX ADMIN — HYDROXYZINE HYDROCHLORIDE 100 MG: 25 TABLET ORAL at 10:10

## 2017-07-09 RX ADMIN — IBUPROFEN 800 MG: 800 TABLET ORAL at 06:20

## 2017-07-09 RX ADMIN — HYDROXYZINE HYDROCHLORIDE 100 MG: 25 TABLET ORAL at 16:40

## 2017-07-09 RX ADMIN — NICOTINE POLACRILEX 4 MG: 4 GUM, CHEWING ORAL at 18:14

## 2017-07-09 RX ADMIN — DULOXETINE HYDROCHLORIDE 30 MG: 30 CAPSULE, DELAYED RELEASE ORAL at 08:17

## 2017-07-09 RX ADMIN — TRAZODONE HYDROCHLORIDE 50 MG: 50 TABLET ORAL at 22:26

## 2017-07-09 RX ADMIN — NICOTINE POLACRILEX 4 MG: 4 GUM, CHEWING ORAL at 13:36

## 2017-07-09 RX ADMIN — IBUPROFEN 800 MG: 800 TABLET ORAL at 16:40

## 2017-07-09 RX ADMIN — ACETAMINOPHEN 650 MG: 325 TABLET, FILM COATED ORAL at 01:03

## 2017-07-09 ASSESSMENT — ACTIVITIES OF DAILY LIVING (ADL)
GROOMING: INDEPENDENT
ORAL_HYGIENE: INDEPENDENT
DRESS: SCRUBS (BEHAVIORAL HEALTH);INDEPENDENT
LAUNDRY: UNABLE TO COMPLETE

## 2017-07-09 NOTE — PROGRESS NOTES
"[facility]  Psychiatric Progress Note      Impression:   This is a 36 year old yo female with a history of depression, PTSD, and alcohol abuse.    Lorena is in bed when I go to speak with her. She states that last night she got really dizzy and had to have her roommate help her to the floor. This happened shortly after taking a dose of Zyprexa. Her BP was low when checked at that time, though has returned to her normal range today. She has been eating poorly over the last few days due to going through alcohol withdrawal, which also likely contributed to her feelings of dizziness. She is also taking Prazosin, though had taken it the night before as well and did not experience these effects. He does agree to call for help if needed before getting up. She states that her anxiety is better than it was yesterday. Did ask for Valium to be scheduled, though is okay when told that this will only be used to help her get through her withdrawal. She is sleeping better at night. She denies any active suicidal ideation, though states that passive thoughts are \"always there\". Is hopeful to get into CD treatment soon.     Educated regarding medication indications, risks, benefits, side effects, contraindications and possible interactions. Verbally expressed understanding.        DIagnoses:   Major depressive disorder, recurrent, severe  R/O Bipolar 2 disorder (family history)  PTSD  Alcohol use disorder, moderate      Attestation:  Patient has been seen and evaluated by me,  Dana Victoria NP          Interim History:   The patient's care was discussed with the treatment team and chart notes were reviewed.          Medications:     Current Facility-Administered Medications Ordered in Epic   Medication Dose Route Frequency Last Rate Last Dose     prazosin (MINIPRESS) capsule 2 mg  2 mg Oral At Bedtime         albuterol (PROAIR HFA/PROVENTIL HFA/VENTOLIN HFA) Inhaler 2 puff  2 puff Inhalation Q6H PRN         naltrexone " "(DEPADE;REVIA) tablet 50 mg  50 mg Oral Daily   50 mg at 07/07/17 0814     DULoxetine (CYMBALTA) EC capsule 30 mg  30 mg Oral Daily   30 mg at 07/07/17 0814     metaxalone (SKELAXIN) tablet 800 mg  800 mg Oral TID PRN   800 mg at 07/07/17 1150     ibuprofen (ADVIL/MOTRIN) tablet 800 mg  800 mg Oral Q6H PRN   800 mg at 07/06/17 2059     ondansetron (ZOFRAN-ODT) ODT tab 4 mg  4 mg Oral Q6H PRN   4 mg at 07/07/17 1305     hydrOXYzine (ATARAX) tablet 25-50 mg  25-50 mg Oral Q4H PRN   50 mg at 07/07/17 1150     acetaminophen (TYLENOL) tablet 650 mg  650 mg Oral Q4H PRN         traZODone (DESYREL) tablet 50 mg  50 mg Oral At Bedtime PRN         OLANZapine (zyPREXA) tablet 10 mg  10 mg Oral Q2H PRN   10 mg at 07/06/17 2215    Or     OLANZapine (zyPREXA) injection 10 mg  10 mg Intramuscular Q2H PRN         nicotine polacrilex (NICORETTE) gum 2-4 mg  2-4 mg Buccal Q1H PRN         diazepam (VALIUM) tablet 5-20 mg  5-20 mg Oral Q30 Min PRN   10 mg at 07/07/17 0814     No current [facility]-ordered outpatient prescriptions on file.          10 point ROS reviewed- Alcohol withdrawal improving (nausea, chills, diaphoresis, poor appetite), occasional dizziness       Allergies:     Allergies   Allergen Reactions     Compazine [Prochlorperazine] Anaphylaxis     Edisylate/Maleate     Bee Venom             Psychiatric Examination:   /79  Pulse 74  Temp 99.2  F (37.3  C) (Tympanic)  Resp 15  Ht 1.702 m (5' 7\")  Wt 63.2 kg (139 lb 4.8 oz)  SpO2 96%  BMI 21.82 kg/m2  Weight is 139 lbs 4.8 oz  Body mass index is 21.82 kg/(m^2).    Appearance:  Awake, alert, dressed in hospital scrubs, casually groomed  Attitude: cooperative  Eye Contact:  good  Mood:  Anxiety has improved since yesterday though still present, reports depression  Affect: appears anxious, nervous  Speech:  clear, coherent  Psychomotor Behavior:  no evidence of tardive dyskinesia, dystonia, or tics  Thought Process:  logical, linear and goal oriented  Associations:  " no loose associations  Thought Content:  no evidence of suicidal ideation or homicidal ideation, no evidence of psychotic thought and no auditory hallucinations present  Insight:  fair  Judgment:  fair  Oriented to:  time, person, and place  Attention Span and Concentration:  fair  Recent and Remote Memory:  fair  Fund of Knowledge: appropriate  Muscle Strength and Tone: normal  Gait and Station: Normal           Labs:     Results for orders placed or performed during the hospital encounter of 07/05/17 (from the past 24 hour(s))   Glucose by meter   Result Value Ref Range    Glucose 134 (H) 70 - 99 mg/dL              Plan:   Continue to monitor alcohol withdrawal  Continue current medications  Will stop prn Zyprexa due to likely orthostatic hypotension

## 2017-07-09 NOTE — PLAN OF CARE
Face to face end of shift report received from pm RN. Rounding completed. Patient observed.     Prince Ford  7/9/2017  12:11 AM

## 2017-07-09 NOTE — PLAN OF CARE
Face to face end of shift report received from Joy HOBSON RN. Rounding completed. Patient observed in room.     Sofia Elizabeth  7/9/2017  3:51 PM

## 2017-07-09 NOTE — PLAN OF CARE
Problem: General Plan of Care (Inpatient Behavioral)  Goal: Individualization/Patient Specific Goal (IP Behavioral)  The patient and/or their representative will achieve their patient-specific goals related to the plan of care.    The patient-specific goals include:   Patient will verbalize a decrease in suicidal ideation prior to discharge.   Patient will contract for safety if having thoughts of self harm.   Patient will verbalize an acceptable level of pain while on unit.   Patient will have a safe withdrawal from alcohol.   Patient will comply with treatment team recommendations while on unit.   Patient in room most of the shift. Patient still complains that she feels dizzy at times when she stands. Patient does report high anxiety this morning. Patient was given 100 mg of atarax at 1010. Patientdid shower independatly this shift with a shower chair. Patient deneis SI, SIB, hallucinaitons, and delusions. Patient did contract for safety this shift.  Patient ate 50 % of her breakfast and is encouraged to continue to eat meals and snacks. Patient scored a 5 on MSSA this am and does not meet protocol for valium. Patient didn't report any pain this shift.
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
## 2017-07-09 NOTE — PLAN OF CARE
"Problem: General Plan of Care (Inpatient Behavioral)  Goal: Individualization/Patient Specific Goal (IP Behavioral)  The patient and/or their representative will achieve their patient-specific goals related to the plan of care.    The patient-specific goals include:   Patient will verbalize a decrease in suicidal ideation prior to discharge.   Patient will contract for safety if having thoughts of self harm.   Patient will verbalize an acceptable level of pain while on unit.   Patient will have a safe withdrawal from alcohol.   Patient will comply with treatment team recommendations while on unit.   Pt denies SI, HI, or hallucinations at this time. Pt reports high levels of anxiety 8/10 and endorses depression. She states that she feels a little better today. Pt has not reported any dizziness this shift. Gait is balanced and steady. Pt admits to pain 8/10 in her \"colon at this time. She has been out in the lounge several times this shift.    1630- Pt scored 4 on MSSA. No medications given per protocol.  1640- Pt given 100mg Atarax po for c/o high level of anxiety. Ibuprofen also given for pain.  2000-Pt scored a 2 on MSSA at this time. No medications per protocol.  2226- Pt given Trazadone for sleep.            "

## 2017-07-09 NOTE — PLAN OF CARE
Face to face end of shift report received from Sofia OMALLEY. Rounding completed. Patient observed in room sleeping.    Joy Trinidad  7/9/2017  7:36 AM

## 2017-07-10 PROCEDURE — 12400000 ZZH R&B MH

## 2017-07-10 PROCEDURE — 25000132 ZZH RX MED GY IP 250 OP 250 PS 637: Performed by: NURSE PRACTITIONER

## 2017-07-10 PROCEDURE — 99232 SBSQ HOSP IP/OBS MODERATE 35: CPT | Performed by: NURSE PRACTITIONER

## 2017-07-10 RX ORDER — PRAZOSIN HYDROCHLORIDE 5 MG/1
5 CAPSULE ORAL AT BEDTIME
Status: DISCONTINUED | OUTPATIENT
Start: 2017-07-10 | End: 2017-07-16 | Stop reason: HOSPADM

## 2017-07-10 RX ORDER — TRAZODONE HYDROCHLORIDE 50 MG/1
50-100 TABLET, FILM COATED ORAL
Status: DISCONTINUED | OUTPATIENT
Start: 2017-07-10 | End: 2017-07-16 | Stop reason: HOSPADM

## 2017-07-10 RX ORDER — DULOXETIN HYDROCHLORIDE 20 MG/1
40 CAPSULE, DELAYED RELEASE ORAL DAILY
Status: DISCONTINUED | OUTPATIENT
Start: 2017-07-11 | End: 2017-07-12

## 2017-07-10 RX ADMIN — HYDROXYZINE HYDROCHLORIDE 100 MG: 25 TABLET ORAL at 16:47

## 2017-07-10 RX ADMIN — DULOXETINE HYDROCHLORIDE 30 MG: 30 CAPSULE, DELAYED RELEASE ORAL at 08:28

## 2017-07-10 RX ADMIN — TRAZODONE HYDROCHLORIDE 100 MG: 50 TABLET ORAL at 21:35

## 2017-07-10 RX ADMIN — PRAZOSIN HYDROCHLORIDE 5 MG: 5 CAPSULE ORAL at 20:22

## 2017-07-10 RX ADMIN — DIAZEPAM 5 MG: 5 TABLET ORAL at 13:40

## 2017-07-10 RX ADMIN — NICOTINE POLACRILEX 4 MG: 4 GUM, CHEWING ORAL at 17:31

## 2017-07-10 RX ADMIN — NALTREXONE HYDROCHLORIDE 50 MG: 50 TABLET, FILM COATED ORAL at 08:28

## 2017-07-10 ASSESSMENT — ACTIVITIES OF DAILY LIVING (ADL)
GROOMING: INDEPENDENT
DRESS: SCRUBS (BEHAVIORAL HEALTH);INDEPENDENT
LAUNDRY: UNABLE TO COMPLETE
ORAL_HYGIENE: INDEPENDENT

## 2017-07-10 NOTE — PLAN OF CARE
Face to face end of shift report received from LYSSA Morrison. Rounding completed. Patient observed in room.      Donis Mcclendon  7/10/2017  8:00 AM

## 2017-07-10 NOTE — PLAN OF CARE
Problem: General Plan of Care (Inpatient Behavioral)  Goal: Team Discussion  Team Plan:   BEHAVIORAL TEAM DISCUSSION     Participants: Yanely Fernandez, TRS, Kimberlyn Bhat, OT, Salma Cifuentes, Supervisor, Debbie Dunn CNP, Danae Lawton CNP, Dana Victoria, WARREN, Sofia Vanegas, SW, Kristan Loyd, OT, Sarita Jeffery, SW, Mari Pedraza DCP, Jessica Hitchcock, SUKUMAR, Donis Mcclendon RN   Progress: minimal - anxious from triggers from PTSD   Continued Stay Criteria/Rationale: monitoring med adjustments, passive suicidal thoughts   Medical/Physical: endometriosis   Precautions:   Behavioral Orders   Procedures     Code 1 - Restrict to Unit     Routine Programming       As clinically indicated     Status 15       Every 15 minutes.     Plan: stabilize and discharge, set up resources   Rationale for change in precautions or plan: none

## 2017-07-10 NOTE — PLAN OF CARE
Face to face end of shift report received from pm RN. Rounding completed. Patient observed.     Prince Ford  7/9/2017  11:42 PM

## 2017-07-10 NOTE — PROGRESS NOTES
"[facility]  Psychiatric Progress Note      Impression:   This is a 36 year old yo female with a history of depression, PTSD, and alcohol abuse.    Lorena is in bed when I go to speak with her today. She states that she continues to experience nightmares every night and she wants them to go away. Discussed that we can try increase in Prazosin tonight, though will have to monitor BP. Also discussed needing to start therapy and continuing with it consistently as she had reported that every time it \"starts to get hard\" she would quit. She does not feel that she has been able to appropriately process her trauma and has been self-medicating with alcohol instead. Her alcohol withdrawal has improved, she has been able to eat a little more of her meals today. Has not attended groups today though states that she will try to go this evening. Reports continue depression and anxiety. Denies any notable side effects from medications.    Educated regarding medication indications, risks, benefits, side effects, contraindications and possible interactions. Verbally expressed understanding.        DIagnoses:   Major depressive disorder, recurrent, severe  R/O Bipolar 2 disorder (family history)  PTSD  Alcohol use disorder, moderate      Attestation:  Patient has been seen and evaluated by me,  Dana Victoria NP          Interim History:   The patient's care was discussed with the treatment team and chart notes were reviewed.          Medications:     Current Facility-Administered Medications Ordered in Epic   Medication Dose Route Frequency Last Rate Last Dose     prazosin (MINIPRESS) capsule 2 mg  2 mg Oral At Bedtime         albuterol (PROAIR HFA/PROVENTIL HFA/VENTOLIN HFA) Inhaler 2 puff  2 puff Inhalation Q6H PRN         naltrexone (DEPADE;REVIA) tablet 50 mg  50 mg Oral Daily   50 mg at 07/07/17 0814     DULoxetine (CYMBALTA) EC capsule 30 mg  30 mg Oral Daily   30 mg at 07/07/17 0814     metaxalone (SKELAXIN) " "tablet 800 mg  800 mg Oral TID PRN   800 mg at 07/07/17 1150     ibuprofen (ADVIL/MOTRIN) tablet 800 mg  800 mg Oral Q6H PRN   800 mg at 07/06/17 2059     ondansetron (ZOFRAN-ODT) ODT tab 4 mg  4 mg Oral Q6H PRN   4 mg at 07/07/17 1305     hydrOXYzine (ATARAX) tablet 25-50 mg  25-50 mg Oral Q4H PRN   50 mg at 07/07/17 1150     acetaminophen (TYLENOL) tablet 650 mg  650 mg Oral Q4H PRN         traZODone (DESYREL) tablet 50 mg  50 mg Oral At Bedtime PRN         OLANZapine (zyPREXA) tablet 10 mg  10 mg Oral Q2H PRN   10 mg at 07/06/17 2215    Or     OLANZapine (zyPREXA) injection 10 mg  10 mg Intramuscular Q2H PRN         nicotine polacrilex (NICORETTE) gum 2-4 mg  2-4 mg Buccal Q1H PRN         diazepam (VALIUM) tablet 5-20 mg  5-20 mg Oral Q30 Min PRN   10 mg at 07/07/17 0814     No current Epic-ordered outpatient prescriptions on file.          10 point ROS reviewed- mild ETOH withdrawal       Allergies:     Allergies   Allergen Reactions     Compazine [Prochlorperazine] Anaphylaxis     Edisylate/Maleate     Bee Venom             Psychiatric Examination:   /81  Pulse 80  Temp 99  F (37.2  C) (Tympanic)  Resp 17  Ht 1.702 m (5' 7\")  Wt 61.2 kg (134 lb 14.7 oz)  SpO2 97%  BMI 21.13 kg/m2  Weight is 134 lbs 14.74 oz  Body mass index is 21.13 kg/(m^2).    Appearance:  Awake, alert, dressed in hospital scrubs, casually groomed  Attitude: cooperative  Eye Contact: good  Mood:  Anxious and depressed though improved from admit  Affect: appears anxious, tearful today  Speech:  clear, coherent  Psychomotor Behavior:  no evidence of tardive dyskinesia, dystonia, or tics  Thought Process:  logical, linear and goal oriented  Associations:  no loose associations  Thought Content:  no evidence of suicidal ideation or homicidal ideation, no evidence of psychotic thought and no auditory hallucinations present  Insight:  fair  Judgment:  fair  Oriented to:  time, person, and place  Attention Span and Concentration:  " fair  Recent and Remote Memory:  fair  Fund of Knowledge: appropriate  Muscle Strength and Tone: normal  Gait and Station: Normal           Labs:     No results found for this or any previous visit (from the past 24 hour(s)).           Plan:   Continue to monitor alcohol withdrawal  Increase Prazosin to 5 mg at bedtime  Increase Cymbalta to 40 mg daily tomorrow

## 2017-07-10 NOTE — PLAN OF CARE
Face to face end of shift report received from Donis HOBSON RN. Rounding completed. Patient observed in room.     Sofia Romobismark  7/10/2017  3:51 PM

## 2017-07-10 NOTE — PLAN OF CARE
"Problem: General Plan of Care (Inpatient Behavioral)  Goal: Individualization/Patient Specific Goal (IP Behavioral)  The patient and/or their representative will achieve their patient-specific goals related to the plan of care.    The patient-specific goals include:   Patient will verbalize a decrease in suicidal ideation prior to discharge.   Patient will contract for safety if having thoughts of self harm.   Patient will verbalize an acceptable level of pain while on unit.   Patient will have a safe withdrawal from alcohol.   Patient will comply with treatment team recommendations while on unit.   She has been in bed this morning. She talks about being sweaty during the night. She is not visibly diaphoretic. She is wrapped up in several blankets and covers her head. She sleeps with ear plugs in and didn't hear when called for breakfast. Breakfast brought to her and she did eat 25%. She has no complaints of nausea. She says that she feels that she is \"through the alcohol withdrawal but I was doing adderall\". She feels that she is in adderall withdrawal. She has spent a lot of time in her bed. There is a call bell at her bedside and a wheelchair. She is encouraged to rise slowly when getting up. She is not making any complaints of pain at this time.   1230: sitting in room crying. She did not eat any lunch. She talks of constant nightmares, feelings of hopelessness, and worthlessness.   1340: patient scored 9 on MSSA scale and received 5 mg of valium.  1415: Patient is less tearful but continues to isolate in her room. She is given 2 apple juices as she did not eat lunch and this is what she requested.     "

## 2017-07-10 NOTE — PLAN OF CARE
"Problem: General Plan of Care (Inpatient Behavioral)  Goal: Individualization/Patient Specific Goal (IP Behavioral)  The patient and/or their representative will achieve their patient-specific goals related to the plan of care.    The patient-specific goals include:   Patient will verbalize a decrease in suicidal ideation prior to discharge.   Patient will contract for safety if having thoughts of self harm.   Patient will verbalize an acceptable level of pain while on unit.   Patient will have a safe withdrawal from alcohol.   Patient will comply with treatment team recommendations while on unit.   Pt reports having a \"bad\" day today. She states that she continues to have nightmares all night that are very vivid. She is teary upon talking to her about this. Pt encouraged to try to use some form of coping skills. She is sitting in room drawing at this time and this helps. Pt endorses anxiety and depression. She denies SI, HI, hallucinations and does not admit to pain at this time. Pt encouraged to continue to slow rise and has tap bell by bedside. Pt has been free from self harm this shift.  1647-100mg Atarax given for anxiety.  2135- administered Trazadone 100mg po for sleep.  Problem: Fall Risk (Adult)  Goal: Absence of Falls  Patient will be free from fall during hospital stay.  Pt will use tap bell as instructed.   Pt has been free from falls this shift.      "

## 2017-07-11 PROCEDURE — 97530 THERAPEUTIC ACTIVITIES: CPT | Mod: GO

## 2017-07-11 PROCEDURE — 12400000 ZZH R&B MH

## 2017-07-11 PROCEDURE — 25000132 ZZH RX MED GY IP 250 OP 250 PS 637: Performed by: NURSE PRACTITIONER

## 2017-07-11 PROCEDURE — 99232 SBSQ HOSP IP/OBS MODERATE 35: CPT | Performed by: NURSE PRACTITIONER

## 2017-07-11 PROCEDURE — 40000133 ZZH STATISTIC OT WARD VISIT

## 2017-07-11 RX ADMIN — TRAZODONE HYDROCHLORIDE 100 MG: 50 TABLET ORAL at 20:15

## 2017-07-11 RX ADMIN — NALTREXONE HYDROCHLORIDE 50 MG: 50 TABLET, FILM COATED ORAL at 08:45

## 2017-07-11 RX ADMIN — HYDROXYZINE HYDROCHLORIDE 100 MG: 25 TABLET ORAL at 16:28

## 2017-07-11 RX ADMIN — DULOXETINE HYDROCHLORIDE 40 MG: 20 CAPSULE, DELAYED RELEASE ORAL at 08:41

## 2017-07-11 RX ADMIN — HYDROXYZINE HYDROCHLORIDE 100 MG: 25 TABLET ORAL at 21:58

## 2017-07-11 RX ADMIN — DIAZEPAM 5 MG: 5 TABLET ORAL at 19:00

## 2017-07-11 RX ADMIN — PRAZOSIN HYDROCHLORIDE 5 MG: 5 CAPSULE ORAL at 20:15

## 2017-07-11 RX ADMIN — TRAZODONE HYDROCHLORIDE 50 MG: 50 TABLET ORAL at 21:58

## 2017-07-11 ASSESSMENT — ACTIVITIES OF DAILY LIVING (ADL)
DRESS: SCRUBS (BEHAVIORAL HEALTH)
DRESS: SCRUBS (BEHAVIORAL HEALTH);INDEPENDENT
GROOMING: INDEPENDENT
ORAL_HYGIENE: INDEPENDENT
LAUNDRY: UNABLE TO COMPLETE
GROOMING: INDEPENDENT
ORAL_HYGIENE: INDEPENDENT

## 2017-07-11 NOTE — PROGRESS NOTES
[facility]  Psychiatric Progress Note      Impression:   This is a 36 year old yo female with a history of depression, PTSD, and alcohol abuse.    Lorena has been up in the lounge and has been attending groups today. She states that she is feeling better. She reports that she slept well last night. Denies any notable side effects from medications. States that her parents will be coming to visit her tonight, which she is looking forward to. She reports continued anxiety, though feels that it has improved. She denies any suicidal ideation and her affect is brighter today.    Educated regarding medication indications, risks, benefits, side effects, contraindications and possible interactions. Verbally expressed understanding.        DIagnoses:   Major depressive disorder, recurrent, severe  R/O Bipolar 2 disorder (family history)  PTSD  Alcohol use disorder, moderate      Attestation:  Patient has been seen and evaluated by me,  Dana Victoria NP          Interim History:   The patient's care was discussed with the treatment team and chart notes were reviewed.          Medications:     Current Facility-Administered Medications Ordered in Epic   Medication Dose Route Frequency Last Rate Last Dose     prazosin (MINIPRESS) capsule 2 mg  2 mg Oral At Bedtime         albuterol (PROAIR HFA/PROVENTIL HFA/VENTOLIN HFA) Inhaler 2 puff  2 puff Inhalation Q6H PRN         naltrexone (DEPADE;REVIA) tablet 50 mg  50 mg Oral Daily   50 mg at 07/07/17 0814     DULoxetine (CYMBALTA) EC capsule 30 mg  30 mg Oral Daily   30 mg at 07/07/17 0814     metaxalone (SKELAXIN) tablet 800 mg  800 mg Oral TID PRN   800 mg at 07/07/17 1150     ibuprofen (ADVIL/MOTRIN) tablet 800 mg  800 mg Oral Q6H PRN   800 mg at 07/06/17 2059     ondansetron (ZOFRAN-ODT) ODT tab 4 mg  4 mg Oral Q6H PRN   4 mg at 07/07/17 1305     hydrOXYzine (ATARAX) tablet 25-50 mg  25-50 mg Oral Q4H PRN   50 mg at 07/07/17 1150     acetaminophen (TYLENOL) tablet  "650 mg  650 mg Oral Q4H PRN         traZODone (DESYREL) tablet 50 mg  50 mg Oral At Bedtime PRN         OLANZapine (zyPREXA) tablet 10 mg  10 mg Oral Q2H PRN   10 mg at 07/06/17 2215    Or     OLANZapine (zyPREXA) injection 10 mg  10 mg Intramuscular Q2H PRN         nicotine polacrilex (NICORETTE) gum 2-4 mg  2-4 mg Buccal Q1H PRN         diazepam (VALIUM) tablet 5-20 mg  5-20 mg Oral Q30 Min PRN   10 mg at 07/07/17 0814     No current Epic-ordered outpatient prescriptions on file.          10 point ROS reviewed- denying pain today       Allergies:     Allergies   Allergen Reactions     Compazine [Prochlorperazine] Anaphylaxis     Edisylate/Maleate     Bee Venom             Psychiatric Examination:   /70  Pulse 83  Temp 98.8  F (37.1  C) (Tympanic)  Resp 16  Ht 1.702 m (5' 7\")  Wt 61.2 kg (134 lb 14.7 oz)  SpO2 97%  BMI 21.13 kg/m2  Weight is 134 lbs 14.74 oz  Body mass index is 21.13 kg/(m^2).    Appearance:  Awake, alert, dressed in hospital scrubs, casually groomed  Attitude: cooperative  Eye Contact: good  Mood:  Improving, though still anxious and depressed  Affect: less anxious today  Speech:  clear, coherent  Psychomotor Behavior:  no evidence of tardive dyskinesia, dystonia, or tics  Thought Process:  logical, linear and goal oriented  Associations:  no loose associations  Thought Content:  no evidence of suicidal ideation or homicidal ideation, no evidence of psychotic thought and no auditory hallucinations present  Insight:  fair  Judgment:  fair  Oriented to:  time, person, and place  Attention Span and Concentration:  fair  Recent and Remote Memory:  fair  Fund of Knowledge: appropriate  Muscle Strength and Tone: normal  Gait and Station: Normal           Labs:     No results found for this or any previous visit (from the past 24 hour(s)).           Plan:   Continue medications  Continue to monitor alcohol withdrawal  Provide CD treatment resources, set-up with outpt MH services prior to " d/c

## 2017-07-11 NOTE — PLAN OF CARE
Problem: General Plan of Care (Inpatient Behavioral)  Goal: Team Discussion  Team Plan:   BEHAVIORAL TEAM DISCUSSION     Participants: Debbie Dunn NP, Dana Victoria NP, Danae Lawton NP, Sofia Lam [facility], Sarita Jeffery [facility], Mari Pedraza [facility], Salma Cifuentes RN, Raegan Dalal RN, Kimberlyn Bhat OT      Progress: sleeping well, improved mood  Continued Stay Criteria/Rationale: med adjustments  Medical/Physical: endometriosis  Precautions:   Behavioral Orders   Procedures     Code 1 - Restrict to Unit     Routine Programming       As clinically indicated     Status 15       Every 15 minutes.     Plan: continue to stabilize on meds, increased cymbalta, get insurance active to set outpatient services set up  Rationale for change in precautions or plan: None

## 2017-07-11 NOTE — PLAN OF CARE
Face to face end of shift report received from ronnie Black . Rounding completed. Patient observed. Lying in supine position - eyes closed - non-labored breathing noted.     Emily Oneill  7/11/2017  3:06 AM

## 2017-07-11 NOTE — PLAN OF CARE
"Problem: General Plan of Care (Inpatient Behavioral)  Goal: Individualization/Patient Specific Goal (IP Behavioral)  The patient and/or their representative will achieve their patient-specific goals related to the plan of care.    The patient-specific goals include:   Patient will verbalize a decrease in suicidal ideation prior to discharge.   Patient will contract for safety if having thoughts of self harm.   Patient will verbalize an acceptable level of pain while on unit.   Patient will have a safe withdrawal from alcohol.   Patient will comply with treatment team recommendations while on unit.   Outcome: No Change  Pt spent much of the shift in her room, resting. Has been up on the unit for meals and is attending group this afternoon, however. Pleasant and cooperative on assessment. Acknowledges continued feelings of \"hopelessness,\" depression, and anxiety--but did deny SI. Did eat 50% of breakfast and lunch meals. Minimal signs/symptoms of ETOH withdrawal present--with pt scoring a '7' on the MSSA tool this shift. No PRN Valium given. Pt did tell this writer that she slept \"good\" last night. Reported \"really weird, vivid dreams,\" but denied nightmares. Has remained free from self-harm and/or injury this shift.     Problem: Fall Risk (Adult)  Goal: Absence of Falls  Patient will be free from fall during hospital stay.  Pt will use tap bell as instructed.   Outcome: No Change  Pt has remained free from falls and/or injury this shift. Tap bell and wheelchair remain at bedside.      "

## 2017-07-11 NOTE — DISCHARGE INSTRUCTIONS
Behavioral Discharge Planning and Instructions    Summary: Lorena was admitted for increased depression with suicidal ideation.    Main Diagnosis: Major depressive disorder, recurrent, severe, R/O Bipolar 2 disorder (family history), PTSD, Alcohol use disorder, moderate.     Major Treatments, Procedures and Findings: Stabilize with medications, connect with community programs.     Symptoms to Report: feeling more aggressive, increased confusion, losing more sleep, mood getting worse or thoughts of suicide    Lifestyle Adjustment: Take all medications as prescribed, meet with doctor/ medication provider, out patient therapist, , and [facility] worker as scheduled. Abstain from alcohol or any unprescribed drugs.     Psychiatry Follow-up:     [place] [facility] - referral sent 7/7/2017   Individual Therapy - July 19th, @ 1 pm.   ARM - Therapist will give referral at appointment  Medication Management - Needs a primary doctor to give referral.  [address] [address]  Phone: [phone]  Fax: [phone]    [facility]   PCP - Zenon Schmidt - July 25th @ 10:20 (will need referral for psychiatry at the [facility])   Phone: [phone]  Fax: [phone]     [facility]   Rule 25 - August 1st @ 12:00 (two hour appointment)   [address] [place] [address]   Phone: [phone]   Fax: [phone]    Resources:   Crisis Intervention: 976.811.5298 or 239-322-2137 (TTY: 236.245.4504).  Call anytime for help.  [place] on Mental Illness (www.[place].araceli.org): 116.953.3715 or 112-613-7574.  Alcoholics Anonymous (www.alcoholics-anonymous.org): Check your phone book for your local chapter.  Suicide Awareness Voices of Education (SAVE) (www.save.org): 074-016-BINX (2945)  National Suicide Prevention Line (www.mentalhealthmn.org): 789-019-FOEO (1920)  Mental Health Consumer/Survivor Network of [place] (www.mhcsn.net): 429.700.3119 or 024-486-1158  Mental Health  Association of [place] (www.mentalhealth.org): 793.259.3891 or 033-509-0926    General Medication Instructions:   See your medication sheet(s) for instructions.   Take all medicines as directed.  Make no changes unless your doctor suggests them.   Go to all your doctor visits.  Be sure to have all your required lab tests. This way, your medicines can be refilled on time.  Do not use any drugs not prescribed by your doctor.  Avoid alcohol.    [place] Area:  [facility], Crisis stabilization \A Chronology of [place] Hospitals\""- [phone]  [facility] Crisis Line: 1-651.543.9704  Advocates For Family Peace: 696-6862  Sexual Assault Program [facility]: [phone] or 1-321.824.2989  [place] Battered Women's Program: 1-683.105.2222 Ext: 279       Calls answered Mon-Fri-8:00 am--4:30 pm    [place]:  Advocates for Family Peace: 1-710.173.7212  [facility] first call for help: 6-174-849-2761  [facility] Counseling Crisis Center:  ([phone]      [place] Area:  Warm Line: 1-484.605.3580       Calls answered Tuesday--Saturday 4:00 pm--10:00 pm  [facility] Crisis Line - [phone]  [place] [facility] 483-382-2337    [place] Statewide:  [place] Crisis and Referral Services: 1-621.296.4501  National Suicide Prevention Lifeline: 2-938-618-TALK (2441)   - etc6jwrs- Text  Life  to 39197  First Call for Help: 2-1-1  TEJINDER Helpline- 7-822-UXRQ-HELP

## 2017-07-11 NOTE — PLAN OF CARE
Problem: General Plan of Care (Inpatient Behavioral)  Goal: Individualization/Patient Specific Goal (IP Behavioral)  The patient and/or their representative will achieve their patient-specific goals related to the plan of care.    The patient-specific goals include:   Patient will verbalize a decrease in suicidal ideation prior to discharge.   Patient will contract for safety if having thoughts of self harm.   Patient will verbalize an acceptable level of pain while on unit.   Patient will have a safe withdrawal from alcohol.   Patient will comply with treatment team recommendations while on unit.   Outcome: No Change  Slept all noc without issue.

## 2017-07-11 NOTE — PLAN OF CARE
Problem: General Plan of Care (Inpatient Behavioral)  Goal: Patient Schedule  Patient s Schedule:   Pt issued handout on coping, calming, alerting and stress management.  Pt guided through worksheet on calming verses alerting sensory activities.  Pt identified tasks for each sense that she finds helpful for calming and coping.  Pt did state that she likes the lavender essential oil she was given and found it beneficial for relaxation when she was trying to fall asleep. States that she is happy to be here and is feeling a bit better and slightly more hopeful.  States she is willing and eager to go to treatment after.

## 2017-07-11 NOTE — PLAN OF CARE
Face to face end of shift report received from Effie FELTON RN. Rounding completed. [place] [facility]     Paula Rogers  7/11/2017  3:57 PM

## 2017-07-12 PROCEDURE — 12400000 ZZH R&B MH

## 2017-07-12 PROCEDURE — 99232 SBSQ HOSP IP/OBS MODERATE 35: CPT | Performed by: NURSE PRACTITIONER

## 2017-07-12 PROCEDURE — 25000125 ZZHC RX 250: Performed by: NURSE PRACTITIONER

## 2017-07-12 PROCEDURE — 25000132 ZZH RX MED GY IP 250 OP 250 PS 637: Performed by: NURSE PRACTITIONER

## 2017-07-12 RX ORDER — BUSPIRONE HYDROCHLORIDE 5 MG/1
5 TABLET ORAL 3 TIMES DAILY
Status: DISCONTINUED | OUTPATIENT
Start: 2017-07-12 | End: 2017-07-16 | Stop reason: HOSPADM

## 2017-07-12 RX ORDER — DULOXETIN HYDROCHLORIDE 60 MG/1
60 CAPSULE, DELAYED RELEASE ORAL DAILY
Status: DISCONTINUED | OUTPATIENT
Start: 2017-07-13 | End: 2017-07-16 | Stop reason: HOSPADM

## 2017-07-12 RX ORDER — OLANZAPINE 2.5 MG/1
2.5 TABLET, FILM COATED ORAL 2 TIMES DAILY PRN
Status: DISCONTINUED | OUTPATIENT
Start: 2017-07-12 | End: 2017-07-14 | Stop reason: DRUGHIGH

## 2017-07-12 RX ADMIN — ONDANSETRON 4 MG: 4 TABLET, ORALLY DISINTEGRATING ORAL at 08:55

## 2017-07-12 RX ADMIN — BUSPIRONE HYDROCHLORIDE 5 MG: 5 TABLET ORAL at 13:01

## 2017-07-12 RX ADMIN — TRAZODONE HYDROCHLORIDE 50 MG: 50 TABLET ORAL at 23:46

## 2017-07-12 RX ADMIN — HYDROXYZINE HYDROCHLORIDE 100 MG: 25 TABLET ORAL at 23:46

## 2017-07-12 RX ADMIN — IBUPROFEN 800 MG: 800 TABLET ORAL at 13:01

## 2017-07-12 RX ADMIN — OLANZAPINE 2.5 MG: 2.5 TABLET, FILM COATED ORAL at 13:01

## 2017-07-12 RX ADMIN — DULOXETINE HYDROCHLORIDE 40 MG: 20 CAPSULE, DELAYED RELEASE ORAL at 08:55

## 2017-07-12 RX ADMIN — PRAZOSIN HYDROCHLORIDE 5 MG: 5 CAPSULE ORAL at 21:01

## 2017-07-12 RX ADMIN — IBUPROFEN 800 MG: 800 TABLET ORAL at 21:01

## 2017-07-12 RX ADMIN — NALTREXONE HYDROCHLORIDE 50 MG: 50 TABLET, FILM COATED ORAL at 08:55

## 2017-07-12 RX ADMIN — METAXALONE 800 MG: 800 TABLET ORAL at 10:39

## 2017-07-12 RX ADMIN — ACETAMINOPHEN 650 MG: 325 TABLET, FILM COATED ORAL at 18:22

## 2017-07-12 RX ADMIN — TRAZODONE HYDROCHLORIDE 100 MG: 50 TABLET ORAL at 21:04

## 2017-07-12 RX ADMIN — BUSPIRONE HYDROCHLORIDE 5 MG: 5 TABLET ORAL at 21:01

## 2017-07-12 RX ADMIN — HYDROXYZINE HYDROCHLORIDE 100 MG: 25 TABLET ORAL at 18:22

## 2017-07-12 RX ADMIN — ACETAMINOPHEN 650 MG: 325 TABLET, FILM COATED ORAL at 09:04

## 2017-07-12 ASSESSMENT — ACTIVITIES OF DAILY LIVING (ADL)
LAUNDRY: UNABLE TO COMPLETE
DRESS: SCRUBS (BEHAVIORAL HEALTH);INDEPENDENT
GROOMING: INDEPENDENT
ORAL_HYGIENE: INDEPENDENT
GROOMING: INDEPENDENT
DRESS: SCRUBS (BEHAVIORAL HEALTH)
ORAL_HYGIENE: INDEPENDENT

## 2017-07-12 NOTE — PLAN OF CARE
Face to face end of shift report received from Janette FELTON RN. Rounding completed. Patient observed in room.     Paula Rogers  7/12/2017  5:27 PM

## 2017-07-12 NOTE — PLAN OF CARE
Problem: General Plan of Care (Inpatient Behavioral)  Goal: Team Discussion  Team Plan:   BEHAVIORAL TEAM DISCUSSION     Participants: Debbie Dunn NP, Danae Lawton NP,Sofia Lam LICSW, Unique Villegas LICSW, Anastasiya Hitchcock LSW, Sarita Jeffery LICSW, Mari Pedraza Discharge Plannner, Salma Cifuentes RN, Raegan Dalal RN, Yanely Larose [facility], Kristan Loyd OT, Kimberlyn Bhat OT      Progress: fair  Continued Stay Criteria/Rationale: continue to adjust meds  Medical/Physical: endometriosis  Precautions:   Behavioral Orders   Procedures     Code 1 - Restrict to Unit     Routine Programming       As clinically indicated     Status 15       Every 15 minutes.     Plan: stabilize on meds and then discharge back to her previous living situation with numerous services in place  Rationale for change in precautions or plan: None

## 2017-07-12 NOTE — PLAN OF CARE
"Problem: General Plan of Care (Inpatient Behavioral)  Goal: Individualization/Patient Specific Goal (IP Behavioral)  The patient and/or their representative will achieve their patient-specific goals related to the plan of care.    The patient-specific goals include:   Patient will verbalize a decrease in suicidal ideation prior to discharge.   Patient will contract for safety if having thoughts of self harm.   Patient will verbalize an acceptable level of pain while on unit.   Patient will have a safe withdrawal from alcohol.   Patient will comply with treatment team recommendations while on unit.   Outcome: No Change  Patient cooperative with assessment questioning and medication administration. Denies SI, HI, depression, and hallucinations. Admits to anxiety being \"very high\" and pain 9/10 \"in my colon from my endometriosis\". States she needs to have surgery again, has had two prior surgeries related to her endometriosis, but is scared to have the third. States her sister suffers with this too. States she currently has her menses and this aggravates her symptoms. Has been isolative and withdrawn to her room, choosing to stay in bed, states she is tired from the skelaxin she received earlier in the day. Did request and accept Tylenol 650 mg and Atarax 100 mg PO at 1822, then came out to the [facility] to socialize, and did attend group after snack. Is requesting to speak to a  tomorrow morning regarding paperwork she needs faxed to her dad. Has been appropriate with staff and peers.   2105-requested and accepted Trazodone 100 mg PO.      "

## 2017-07-12 NOTE — PLAN OF CARE
Face to face end of shift report received from ronnie Hicks. Rounding completed. Patient observed. Lying in prone position - eyes closed - non-labored rbreathing noted.    Emily Oneill  7/12/2017  3:30 AM

## 2017-07-12 NOTE — PLAN OF CARE
"Problem: General Plan of Care (Inpatient Behavioral)  Goal: Individualization/Patient Specific Goal (IP Behavioral)  The patient and/or their representative will achieve their patient-specific goals related to the plan of care.    The patient-specific goals include:   Patient will verbalize a decrease in suicidal ideation prior to discharge.   Patient will contract for safety if having thoughts of self harm.   Patient will verbalize an acceptable level of pain while on unit.   Patient will have a safe withdrawal from alcohol.   Patient will comply with treatment team recommendations while on unit.   Outcome: No Change  Pt has isolated to her room all shift. Did not attend groups. Pt with c/o nausea and \"colon pain\"--rated 9/10--on initial assessment this morning. Pt also reported that she started her period. PRN Zofran ODT 4 mg PO and PRN APAP 650 mg PO given at approximately 0900. PRN Zofran effective. Pt reported no relief/decrease in pain level with PRN APAP. Pt continued with c/o \"colon pain\" later in the morning. PRN Skelaxin 800 mg PO given at approximately 1040--with, again, no reported relief/decrease in pain. Pt continued this afternoon with c/o pain and anxiety. PRN Ibuprofen 800 mg PO and PRN Zyprexa 2.5 mg PO given. Score of '7' on the MSSA tool this AM. No overt signs/symptoms of ETOH withdrawal. MSSA tool discontinued by provider. Pt provided with education/handout on Buspar.     Problem: Fall Risk (Adult)  Goal: Absence of Falls  Patient will be free from fall during hospital stay.  Pt will use tap bell as instructed.   Outcome: No Change  Pt has remained free from falls and/or injury this shift. Tap will remains at the bedside.      "

## 2017-07-12 NOTE — PLAN OF CARE
Face to face end of shift report received from DOLLY Oneill RN. Rounding completed. Patient observed, resting in bed.     Girish Mancera  7/12/2017  9:43 AM

## 2017-07-12 NOTE — PROGRESS NOTES
[facility]  Psychiatric Progress Note      Impression:   This is a 36 year old yo female with a history of depression, PTSD, and alcohol abuse.    Lorena is isolative to her room today. She is c/o abdominal and pelvic pain related to endometriosis and menstrual cramps. Lorena is also continuing to complain of social anxiety and worry. Will order 2.5 mg of PRN olanzapine twice a day for anxiety and start buspar 5 mg TID. Will also increase duloxetine to help further target depressive symptoms.     Educated regarding medication indications, risks, benefits, side effects, contraindications and possible interactions. Verbally expressed understanding.        DIagnoses:   Major depressive disorder, recurrent, severe  R/O Bipolar 2 disorder (family history)  PTSD  Alcohol use disorder, moderate      Attestation:  Patient has been seen and evaluated by me,  Danae Lawton NP          Interim History:   The patient's care was discussed with the treatment team and chart notes were reviewed.          Medications:     Current Facility-Administered Medications Ordered in Epic   Medication Dose Route Frequency Last Rate Last Dose     prazosin (MINIPRESS) capsule 2 mg  2 mg Oral At Bedtime         albuterol (PROAIR HFA/PROVENTIL HFA/VENTOLIN HFA) Inhaler 2 puff  2 puff Inhalation Q6H PRN         naltrexone (DEPADE;REVIA) tablet 50 mg  50 mg Oral Daily   50 mg at 07/07/17 0814     DULoxetine (CYMBALTA) EC capsule 30 mg  30 mg Oral Daily   30 mg at 07/07/17 0814     metaxalone (SKELAXIN) tablet 800 mg  800 mg Oral TID PRN   800 mg at 07/07/17 1150     ibuprofen (ADVIL/MOTRIN) tablet 800 mg  800 mg Oral Q6H PRN   800 mg at 07/06/17 2059     ondansetron (ZOFRAN-ODT) ODT tab 4 mg  4 mg Oral Q6H PRN   4 mg at 07/07/17 1305     hydrOXYzine (ATARAX) tablet 25-50 mg  25-50 mg Oral Q4H PRN   50 mg at 07/07/17 1150     acetaminophen (TYLENOL) tablet 650 mg  650 mg Oral Q4H PRN         traZODone (DESYREL) tablet 50 mg  50 mg  "Oral At Bedtime PRN         OLANZapine (zyPREXA) tablet 10 mg  10 mg Oral Q2H PRN   10 mg at 07/06/17 2215    Or     OLANZapine (zyPREXA) injection 10 mg  10 mg Intramuscular Q2H PRN         nicotine polacrilex (NICORETTE) gum 2-4 mg  2-4 mg Buccal Q1H PRN         diazepam (VALIUM) tablet 5-20 mg  5-20 mg Oral Q30 Min PRN   10 mg at 07/07/17 0814     No current Epic-ordered outpatient prescriptions on file.          10 point ROS reviewed- denying pain today       Allergies:     Allergies   Allergen Reactions     Compazine [Prochlorperazine] Anaphylaxis     Edisylate/Maleate     Bee Venom             Psychiatric Examination:   /63  Pulse 69  Temp 97.7  F (36.5  C) (Tympanic)  Resp 14  Ht 1.702 m (5' 7\")  Wt 61.2 kg (134 lb 14.7 oz)  SpO2 97%  BMI 21.13 kg/m2  Weight is 134 lbs 14.74 oz  Body mass index is 21.13 kg/(m^2).    Appearance:  Awake, alert, dressed in hospital scrubs, casually groomed  Attitude: cooperative  Eye Contact: good  Mood:  Anxious and depressed  Affect: anxious   Speech:  clear, coherent  Psychomotor Behavior:  no evidence of tardive dyskinesia, dystonia, or tics  Thought Process:  logical, linear and goal oriented  Associations:  no loose associations  Thought Content:  No SI/HI no AH/VH  Insight:  fair  Judgment:  fair  Oriented to:  time, person, and place  Attention Span and Concentration:  fair  Recent and Remote Memory:  fair  Fund of Knowledge: appropriate  Muscle Strength and Tone: normal  Gait and Station: Normal           Labs:     No results found for this or any previous visit (from the past 24 hour(s)).           Plan:   Increase Cymbalta to 60 mg  Start Buspar 5 mg TID  Start olanzapine 2.5 mg BID PRN for anxiety  Has skelaxin and ibuprofen for pain-offer these as needed   Provide CD treatment resources, set-up with outpt MH services prior to d/c            "

## 2017-07-12 NOTE — PLAN OF CARE
"Problem: General Plan of Care (Inpatient Behavioral)  Goal: Individualization/Patient Specific Goal (IP Behavioral)  The patient and/or their representative will achieve their patient-specific goals related to the plan of care.    The patient-specific goals include:   Patient will verbalize a decrease in suicidal ideation prior to discharge.   Patient will contract for safety if having thoughts of self harm.   Patient will verbalize an acceptable level of pain while on unit.   Patient will have a safe withdrawal from alcohol.   Patient will comply with treatment team recommendations while on unit.   Outcome: No Change  Patient very anxious, shaky, states she is nervous about visiting her parents this evening. Denies SI, HI, depression, hallucinations, and pain. Requested and accepted Atarax 100 mg PO at 1628 for anxiety 9/10. Patient was in lounge coloring, states she was trying to not think about her anxiety, but it was just getting too high for her. Parents did visit, patient in room crying after visit, states being here and seeing her parents made her extremely anxious, that the Atarax she received did not help her anxiety at all. States her coping skills at home is to drink alcohol, but she knows she can't do that here. Did rinse her ear and dermal cheek piercings with saline. Also states 200 mg of Trazodone is too much, it was difficult to wake fully this morning, requiring 3 cups of coffee instead of her usual one. Patient has been attending groups, and has been appropriate with staff and peers.   MSSA score of 10 at 1900, did receive 5 mg Valium PO.  2015-requested and accepted Trazodone 100 mg PO.  2158-requested and accepted Trazodone 50 mg and Atarax 100 mg PO, states her mind is racing and she is having \"high\" anxiety, and difficulty shutting down and going to sleep. States she misses her dog who usually helps to ease her anxiety.   "

## 2017-07-13 PROCEDURE — 25000132 ZZH RX MED GY IP 250 OP 250 PS 637: Performed by: NURSE PRACTITIONER

## 2017-07-13 PROCEDURE — 99232 SBSQ HOSP IP/OBS MODERATE 35: CPT | Performed by: NURSE PRACTITIONER

## 2017-07-13 PROCEDURE — 12400000 ZZH R&B MH

## 2017-07-13 RX ORDER — BISACODYL 10 MG
10 SUPPOSITORY, RECTAL RECTAL DAILY PRN
Status: DISCONTINUED | OUTPATIENT
Start: 2017-07-13 | End: 2017-07-16 | Stop reason: HOSPADM

## 2017-07-13 RX ORDER — POLYETHYLENE GLYCOL 3350 17 G/17G
17 POWDER, FOR SOLUTION ORAL DAILY PRN
Status: DISCONTINUED | OUTPATIENT
Start: 2017-07-13 | End: 2017-07-16 | Stop reason: HOSPADM

## 2017-07-13 RX ADMIN — HYDROXYZINE HYDROCHLORIDE 100 MG: 25 TABLET ORAL at 20:52

## 2017-07-13 RX ADMIN — HYDROXYZINE HYDROCHLORIDE 100 MG: 25 TABLET ORAL at 13:33

## 2017-07-13 RX ADMIN — BUSPIRONE HYDROCHLORIDE 5 MG: 5 TABLET ORAL at 08:13

## 2017-07-13 RX ADMIN — DULOXETINE HYDROCHLORIDE 60 MG: 60 CAPSULE, DELAYED RELEASE ORAL at 08:13

## 2017-07-13 RX ADMIN — BUSPIRONE HYDROCHLORIDE 5 MG: 5 TABLET ORAL at 20:47

## 2017-07-13 RX ADMIN — NALTREXONE HYDROCHLORIDE 50 MG: 50 TABLET, FILM COATED ORAL at 08:13

## 2017-07-13 RX ADMIN — IBUPROFEN 800 MG: 800 TABLET ORAL at 20:52

## 2017-07-13 RX ADMIN — BUSPIRONE HYDROCHLORIDE 5 MG: 5 TABLET ORAL at 13:31

## 2017-07-13 RX ADMIN — METAXALONE 800 MG: 800 TABLET ORAL at 22:50

## 2017-07-13 RX ADMIN — BISACODYL 10 MG: 10 SUPPOSITORY RECTAL at 20:52

## 2017-07-13 RX ADMIN — TRAZODONE HYDROCHLORIDE 100 MG: 50 TABLET ORAL at 20:47

## 2017-07-13 RX ADMIN — ACETAMINOPHEN 650 MG: 325 TABLET, FILM COATED ORAL at 17:11

## 2017-07-13 RX ADMIN — PRAZOSIN HYDROCHLORIDE 5 MG: 5 CAPSULE ORAL at 20:47

## 2017-07-13 ASSESSMENT — ACTIVITIES OF DAILY LIVING (ADL)
DRESS: SCRUBS (BEHAVIORAL HEALTH)
GROOMING: INDEPENDENT
GROOMING: INDEPENDENT
ORAL_HYGIENE: INDEPENDENT
DRESS: SCRUBS (BEHAVIORAL HEALTH);INDEPENDENT
LAUNDRY: UNABLE TO COMPLETE
LAUNDRY: UNABLE TO COMPLETE
ORAL_HYGIENE: INDEPENDENT

## 2017-07-13 NOTE — PLAN OF CARE
Problem: General Plan of Care (Inpatient Behavioral)  Goal: Patient Schedule  Patient s Schedule:   Pt educated in and guided through meditation body scan activity.  Reports anxiety 9/10 prior to meditation, and 5/10 after 15 min meditation activity. Pt states that she has tried meditation in the past and had not found it beneficial, however states that this task was effective and she is planning to get a meditation loyd for her phone now and use it regularly as a way to manage her stress and anxiety. Will continue OT to further address coping skills.

## 2017-07-13 NOTE — PLAN OF CARE
Face to face end of shift report received from Paula ELLIOTT RN. Rounding completed. Patient observed in [facility].     Vannessa Godwin  7/12/2017  11:36 PM

## 2017-07-13 NOTE — PLAN OF CARE
Problem: Discharge Planning  Goal: Discharge Planning (Adult, OB, Behavioral, Peds)  Writer met with pt who reports she is feeling better and denies SI.  She states that she is thankful she came to the hospital because we are the only ones who have really listened to her and helped her get on the right medications.  Writer discussed her plans to go to CD treatment and writer informed her that we had scheduled a Rule 25 assessment with [facility] for August 1st.  Pt states she spoke with her  and wants to go to [facility] in [place].  Pt asked writer to sent her  a letter stating she was hospitalized for SI. Writer had pt sign a CHATA and writer faxed letter to [facility] and [facility] [employer]. Pt reported that her parents had come to visit and brought her a stuffed animal which really made her feel good that they cared enough to drive 2 hours to visit her and that they would provide a ride home when she is discharged. Writer went over the appointments that had been set up for her with [facility] for therapy and [facility].

## 2017-07-13 NOTE — PLAN OF CARE
Face to face end of shift report received from Sofia SINHA RN. Rounding completed. Patient observed in group room.     Paula Rogers  7/13/2017  4:11 PM

## 2017-07-13 NOTE — PLAN OF CARE
Problem: General Plan of Care (Inpatient Behavioral)  Goal: Team Discussion  Team Plan:   BEHAVIORAL TEAM DISCUSSION     Participants: Dana Victoria NP, Danae Lawton NP, Sofia Lam [facility], Anastasiya Hitchcock LSW, Sarita Jeffery [facility],  Mari Pedraza Discharge Plannner, Kimmy Mcelroy RN, Salma Cifuentes RN, blue veronica RN. Yanely Larose [facility], Kristan Loyd OT, Kimberlyn Bhat OT   Progress: More social, going to groups  Continued Stay Criteria/Rationale: started Buspar 7/12, possible medication adjustments  Medical/Physical: Chronic pain  Precautions:   Behavioral Orders   Procedures     Code 1 - Restrict to Unit     Routine Programming       As clinically indicated     Status 15       Every 15 minutes.     Plan: Continue to stabilize on medications  Rationale for change in precautions or plan: None

## 2017-07-13 NOTE — PLAN OF CARE
Problem: General Plan of Care (Inpatient Behavioral)  Goal: Individualization/Patient Specific Goal (IP Behavioral)  The patient and/or their representative will achieve their patient-specific goals related to the plan of care.    The patient-specific goals include:   Patient will verbalize a decrease in suicidal ideation prior to discharge.   Patient will contract for safety if having thoughts of self harm.   Patient will verbalize an acceptable level of pain while on unit.   Patient will have a safe withdrawal from alcohol.   Patient will comply with treatment team recommendations while on unit.   Pt denies SI, HI, depression and hallucinations. Pt continues to struggle with nightmares at night and feels as if she is reliving her trauma nightly. Pt mood is calm at this time. She requests to talk with social work today.  is aware. Pt did come out for breakfast, socialized with peers and did attend group. Pt is cooperative and compliant with medications. Pt c/o constipation this shift. Pt encouraged to drink increased H20 and walk but would also like med for constipation.   1333- Requested and received 100mg Atarax for anxiety.

## 2017-07-13 NOTE — PLAN OF CARE
Face to face end of shift report received from aVnnessa LUO RN. Rounding completed. Patient observed in bed..     Sofia Elizabeth  7/13/2017  7:35 AM
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
## 2017-07-13 NOTE — PROGRESS NOTES
[facility]  Psychiatric Progress Note      Impression:   This is a 36 year old yo female with a history of depression, PTSD, and alcohol abuse.    Lorena states that she is feeling better today. She notes that she was able to get up this morning and eat in the lounge with the other patients. She has attended all of the groups so far today. Denies any notable side effects from medications. Tolerated the Buspar well. She denies any suicidal ideation today. She does report ongoing anxiety, stating that her social anxiety can be high at times, though is much improved since admission. Discussed potentially increasing Buspar in a day or two which she is agreeable with. She does state that he has not had a BM in about 5 days and would like to have a suppository ordered, stating that this usually helps the most. She reports feeling more hopeful now that she has appointments for  and Rule 25 scheduled at discharge.      Educated regarding medication indications, risks, benefits, side effects, contraindications and possible interactions. Verbally expressed understanding.        DIagnoses:   Major depressive disorder, recurrent, severe  R/O Bipolar 2 disorder (family history)  PTSD  Alcohol use disorder, moderate      Attestation:  Patient has been seen and evaluated by me,  Dana Victoria NP          Interim History:   The patient's care was discussed with the treatment team and chart notes were reviewed.          Medications:     Current Facility-Administered Medications Ordered in Epic   Medication Dose Route Frequency Last Rate Last Dose     prazosin (MINIPRESS) capsule 2 mg  2 mg Oral At Bedtime         albuterol (PROAIR HFA/PROVENTIL HFA/VENTOLIN HFA) Inhaler 2 puff  2 puff Inhalation Q6H PRN         naltrexone (DEPADE;REVIA) tablet 50 mg  50 mg Oral Daily   50 mg at 07/07/17 0814     DULoxetine (CYMBALTA) EC capsule 30 mg  30 mg Oral Daily   30 mg at 07/07/17 0814     metaxalone (SKELAXIN) tablet 800 mg   "800 mg Oral TID PRN   800 mg at 07/07/17 1150     ibuprofen (ADVIL/MOTRIN) tablet 800 mg  800 mg Oral Q6H PRN   800 mg at 07/06/17 2059     ondansetron (ZOFRAN-ODT) ODT tab 4 mg  4 mg Oral Q6H PRN   4 mg at 07/07/17 1305     hydrOXYzine (ATARAX) tablet 25-50 mg  25-50 mg Oral Q4H PRN   50 mg at 07/07/17 1150     acetaminophen (TYLENOL) tablet 650 mg  650 mg Oral Q4H PRN         traZODone (DESYREL) tablet 50 mg  50 mg Oral At Bedtime PRN         OLANZapine (zyPREXA) tablet 10 mg  10 mg Oral Q2H PRN   10 mg at 07/06/17 2215    Or     OLANZapine (zyPREXA) injection 10 mg  10 mg Intramuscular Q2H PRN         nicotine polacrilex (NICORETTE) gum 2-4 mg  2-4 mg Buccal Q1H PRN         diazepam (VALIUM) tablet 5-20 mg  5-20 mg Oral Q30 Min PRN   10 mg at 07/07/17 0814     No current Epic-ordered outpatient prescriptions on file.          10 point ROS reviewed- reports no pain today       Allergies:     Allergies   Allergen Reactions     Compazine [Prochlorperazine] Anaphylaxis     Edisylate/Maleate     Bee Venom             Psychiatric Examination:   /65  Pulse 59  Temp 97.6  F (36.4  C) (Tympanic)  Resp 17  Ht 1.702 m (5' 7\")  Wt 61.2 kg (134 lb 14.7 oz)  SpO2 96%  BMI 21.13 kg/m2  Weight is 134 lbs 14.74 oz  Body mass index is 21.13 kg/(m^2).    Appearance: awake, alert, casually groomed and dressed in hospital scrubs  Attitude: cooperative  Eye Contact: good  Mood: improving, less depressed and anxious today  Affect: still anxious  Speech:  clear, coherent  Psychomotor Behavior:  no evidence of tardive dyskinesia, dystonia, or tics  Thought Process:  logical, linear and goal oriented  Associations:  no loose associations  Thought Content:  Denies SI or HI, denies hallucinations  Insight:  fair  Judgment:  fair  Oriented to:  time, person, and place  Attention Span and Concentration:  fair  Recent and Remote Memory:  fair  Fund of Knowledge: appropriate  Muscle Strength and Tone: normal  Gait and Station: " Normal           Labs:     No results found for this or any previous visit (from the past 24 hour(s)).           Plan:   Continue medications  Order dulcolax suppository PRN for constipation  Consider increase in Buspar tomorrow  Provide CD treatment resources, set-up with outpt MH services prior to d/c

## 2017-07-14 PROCEDURE — 99232 SBSQ HOSP IP/OBS MODERATE 35: CPT | Performed by: NURSE PRACTITIONER

## 2017-07-14 PROCEDURE — 25000132 ZZH RX MED GY IP 250 OP 250 PS 637: Performed by: NURSE PRACTITIONER

## 2017-07-14 PROCEDURE — 12400000 ZZH R&B MH

## 2017-07-14 PROCEDURE — 25000125 ZZHC RX 250: Performed by: NURSE PRACTITIONER

## 2017-07-14 RX ORDER — OLANZAPINE 5 MG/1
5 TABLET ORAL 2 TIMES DAILY PRN
Status: DISCONTINUED | OUTPATIENT
Start: 2017-07-14 | End: 2017-07-16 | Stop reason: HOSPADM

## 2017-07-14 RX ORDER — OLANZAPINE 10 MG/1
10 TABLET ORAL ONCE
Status: COMPLETED | OUTPATIENT
Start: 2017-07-14 | End: 2017-07-14

## 2017-07-14 RX ORDER — HYDROXYZINE HYDROCHLORIDE 25 MG/1
50 TABLET, FILM COATED ORAL 2 TIMES DAILY
Status: DISCONTINUED | OUTPATIENT
Start: 2017-07-14 | End: 2017-07-16 | Stop reason: HOSPADM

## 2017-07-14 RX ORDER — MAGNESIUM CARB/ALUMINUM HYDROX 105-160MG
296 TABLET,CHEWABLE ORAL ONCE
Status: COMPLETED | OUTPATIENT
Start: 2017-07-14 | End: 2017-07-14

## 2017-07-14 RX ADMIN — HYDROXYZINE HYDROCHLORIDE 50 MG: 25 TABLET ORAL at 11:11

## 2017-07-14 RX ADMIN — BUSPIRONE HYDROCHLORIDE 5 MG: 5 TABLET ORAL at 13:14

## 2017-07-14 RX ADMIN — OLANZAPINE 2.5 MG: 2.5 TABLET, FILM COATED ORAL at 17:43

## 2017-07-14 RX ADMIN — OLANZAPINE 10 MG: 10 TABLET, FILM COATED ORAL at 21:39

## 2017-07-14 RX ADMIN — NALTREXONE HYDROCHLORIDE 50 MG: 50 TABLET, FILM COATED ORAL at 09:25

## 2017-07-14 RX ADMIN — ONDANSETRON 4 MG: 4 TABLET, ORALLY DISINTEGRATING ORAL at 12:07

## 2017-07-14 RX ADMIN — BUSPIRONE HYDROCHLORIDE 5 MG: 5 TABLET ORAL at 20:57

## 2017-07-14 RX ADMIN — MAGNESIUM CITRATE 296 ML: 1.75 LIQUID ORAL at 20:54

## 2017-07-14 RX ADMIN — DULOXETINE HYDROCHLORIDE 60 MG: 60 CAPSULE, DELAYED RELEASE ORAL at 09:25

## 2017-07-14 RX ADMIN — BUSPIRONE HYDROCHLORIDE 5 MG: 5 TABLET ORAL at 09:25

## 2017-07-14 RX ADMIN — PRAZOSIN HYDROCHLORIDE 5 MG: 5 CAPSULE ORAL at 20:57

## 2017-07-14 RX ADMIN — HYDROXYZINE HYDROCHLORIDE 50 MG: 25 TABLET ORAL at 20:57

## 2017-07-14 ASSESSMENT — ACTIVITIES OF DAILY LIVING (ADL)
LAUNDRY: UNABLE TO COMPLETE
DRESS: INDEPENDENT;SCRUBS (BEHAVIORAL HEALTH)
ORAL_HYGIENE: INDEPENDENT
GROOMING: PROMPTS;SHOWER
GROOMING: INDEPENDENT
DRESS: INDEPENDENT;SCRUBS (BEHAVIORAL HEALTH)

## 2017-07-14 NOTE — PLAN OF CARE
"Problem: General Plan of Care (Inpatient Behavioral)  Goal: Individualization/Patient Specific Goal (IP Behavioral)  The patient and/or their representative will achieve their patient-specific goals related to the plan of care.    The patient-specific goals include:   Patient will verbalize a decrease in suicidal ideation prior to discharge.   Patient will contract for safety if having thoughts of self harm.   Patient will verbalize an acceptable level of pain while on unit.   Patient will have a safe withdrawal from alcohol.   Patient will comply with treatment team recommendations while on unit.   Pt remains isolative to room today, discussed her social anxiety and how groups of people bring on panic. Denied SI while on the unit, but shared that she is \"afraid for when I get out.\" Does feel more hopeful since having F/U appts in place, with rule 25 next week; pt said she is determined and feels getting into [facility] would be helpful. Requested scheduled Vistaril instead of PRN, as this has been helpful in reducing anxiety. Order received by provider. Continues to report \"moments\" of depression, denied hallucinations. Physical complaints of abdominal and colon pain, declined medications for this but stayed in bed to rest. Nausea with eating lunch, improved after taking Zofran. Up to shower after prompting.      "

## 2017-07-14 NOTE — PLAN OF CARE
Problem: General Plan of Care (Inpatient Behavioral)  Goal: Team Discussion  Team Plan:   BEHAVIORAL TEAM DISCUSSION     Participants: Danae Lawton NP, Anastasiya VILLALBAW, Sarita COCHRANSW, Mari Pedraza Discharge Plannner, Chata La RN. Kristan Loyd OT, Kimberlyn Bhat OT   Progress: attending programming. minimal progress.  Continued Stay Criteria/Rationale: started Buspar 7/12,  medication adjustments, poor sleep  Medical/Physical: Chronic pain  Precautions:   Behavioral Orders   Procedures     Code 1 - Restrict to Unit     Routine Programming       As clinically indicated     Status 15       Every 15 minutes.     Plan: Continue to stabilize on meds  Rationale for change in precautions or plan: none

## 2017-07-14 NOTE — PLAN OF CARE
Problem: General Plan of Care (Inpatient Behavioral)  Goal: Patient Schedule  Patient s Schedule:   Pt guided through handout on creating a Personal Safety Plan.  Pt was able to identify triggers, warning signs and healthy coping skillls. Pt also issued a letter she can use to ask for help from her support system.  Pt issued refill of peppermint essential oil for inhalation for stomach pain and nausea.  Will continue with OT to further identify healthy coping skills.

## 2017-07-14 NOTE — PLAN OF CARE
Face to face end of shift report received from LYSSA Hurd. Rounding completed. Patient observed.     Mikki Solorzano  7/14/2017  9:20 AM

## 2017-07-14 NOTE — PROGRESS NOTES
[facility]  Psychiatric Progress Note      Impression:   This is a 36 year old yo female with a history of depression, PTSD, and alcohol abuse.    Lorena tells me that today she is feeling a bit more anxious and depressed due to increased pain for endometriosis. She does think that if she were home with a heating pad it would be better. She also tells me that this is a fairly new diagnsis for her so she is just learning how to manage symptoms. She reports feeling more hopeful now that she has appointments for  and Rule 25 scheduled at discharge.      Educated regarding medication indications, risks, benefits, side effects, contraindications and possible interactions. Verbally expressed understanding.        DIagnoses:   Major depressive disorder, recurrent, severe  R/O Bipolar 2 disorder (family history)  PTSD  Alcohol use disorder, moderate      Attestation:  Patient has been seen and evaluated by me,  Danae Lawton NP          Interim History:   The patient's care was discussed with the treatment team and chart notes were reviewed.          Medications:     Current Facility-Administered Medications Ordered in Epic   Medication Dose Route Frequency Last Rate Last Dose     prazosin (MINIPRESS) capsule 2 mg  2 mg Oral At Bedtime         albuterol (PROAIR HFA/PROVENTIL HFA/VENTOLIN HFA) Inhaler 2 puff  2 puff Inhalation Q6H PRN         naltrexone (DEPADE;REVIA) tablet 50 mg  50 mg Oral Daily   50 mg at 07/07/17 0814     DULoxetine (CYMBALTA) EC capsule 30 mg  30 mg Oral Daily   30 mg at 07/07/17 0814     metaxalone (SKELAXIN) tablet 800 mg  800 mg Oral TID PRN   800 mg at 07/07/17 1150     ibuprofen (ADVIL/MOTRIN) tablet 800 mg  800 mg Oral Q6H PRN   800 mg at 07/06/17 2059     ondansetron (ZOFRAN-ODT) ODT tab 4 mg  4 mg Oral Q6H PRN   4 mg at 07/07/17 1305     hydrOXYzine (ATARAX) tablet 25-50 mg  25-50 mg Oral Q4H PRN   50 mg at 07/07/17 1150     acetaminophen (TYLENOL) tablet 650 mg  650 mg Oral  "Q4H PRN         traZODone (DESYREL) tablet 50 mg  50 mg Oral At Bedtime PRN         OLANZapine (zyPREXA) tablet 10 mg  10 mg Oral Q2H PRN   10 mg at 07/06/17 2215    Or     OLANZapine (zyPREXA) injection 10 mg  10 mg Intramuscular Q2H PRN         nicotine polacrilex (NICORETTE) gum 2-4 mg  2-4 mg Buccal Q1H PRN         diazepam (VALIUM) tablet 5-20 mg  5-20 mg Oral Q30 Min PRN   10 mg at 07/07/17 0814     No current Epic-ordered outpatient prescriptions on file.          10 point ROS reviewed- reports no pain today       Allergies:     Allergies   Allergen Reactions     Compazine [Prochlorperazine] Anaphylaxis     Edisylate/Maleate     Bee Venom             Psychiatric Examination:   BP 97/51  Pulse 54  Temp 97.2  F (36.2  C) (Tympanic)  Resp 14  Ht 1.702 m (5' 7\")  Wt 61.2 kg (134 lb 14.7 oz)  SpO2 98%  BMI 21.13 kg/m2  Weight is 134 lbs 14.74 oz  Body mass index is 21.13 kg/(m^2).    Appearance: awake, alert, casually groomed and dressed in hospital scrubs  Attitude: cooperative  Eye Contact: good  Mood: improving, slightly anxious today  Affect: still anxious  Speech:  clear, coherent  Psychomotor Behavior:  no evidence of tardive dyskinesia, dystonia, or tics  Thought Process:  logical, linear and goal oriented  Associations:  no loose associations  Thought Content:  Denies SI or HI, denies hallucinations  Insight:  fair  Judgment:  fair  Oriented to:  time, person, and place  Attention Span and Concentration:  fair  Recent and Remote Memory:  fair  Fund of Knowledge: appropriate  Muscle Strength and Tone: normal  Gait and Station: Normal           Labs:     No results found for this or any previous visit (from the past 24 hour(s)).           Plan:   Continue medications  Provide CD treatment resources, set-up with outpt MH services prior to d/c            "

## 2017-07-14 NOTE — PLAN OF CARE
Problem: Discharge Planning  Goal: Discharge Planning (Adult, OB, Behavioral, Peds)  Writer called [employer] in pt financial about pt's insurance status- She checked and found out that pt was approved for [facility] (medicaid) retroactive to May.

## 2017-07-14 NOTE — PLAN OF CARE
Face to face end of shift report received from Mikki HOBSON RN. Rounding completed. Patient observed in group.     Emily Purdy  7/14/2017  4:01 PM

## 2017-07-14 NOTE — PLAN OF CARE
"Problem: General Plan of Care (Inpatient Behavioral)  Goal: Individualization/Patient Specific Goal (IP Behavioral)  The patient and/or their representative will achieve their patient-specific goals related to the plan of care.    The patient-specific goals include:   Patient will verbalize a decrease in suicidal ideation prior to discharge.   Patient will contract for safety if having thoughts of self harm.   Patient will verbalize an acceptable level of pain while on unit.   Patient will have a safe withdrawal from alcohol.   Patient will comply with treatment team recommendations while on unit.   Outcome: No Change  Patient pleasant and cooperative with assessment questioning and medication administration. Denies SI, HI, depression, and hallucinations. Admits to anxiety, states it is high, and pain 10/10 in her abdomen and \"colon\" due to her menses and endometriosis. Did request and receive a suppository, with results, but states \"I aggravated my endo by going\" and that her pain was intense. Did accept a heat pack. Will continue to monitor. Has been attending groups and has been appropriate with staff and peers.   1711-requested and accepted Tylenol 650 mg PO.  2047-requested and accepted Trazodone 100 gm, Ibuprofen 800 mg, and Atarax 100 mg PO, and a suppository.   2205-heat pack applied to lower abdomen.   2250-requested and accepted Skelaxin 800 mg PO, heat pack returned to writer.           "

## 2017-07-14 NOTE — PLAN OF CARE
Face to face end of shift report received from Paula ELLIOTT RN. Rounding completed. Patient observed in bed, appears asleep, respirations observed.     Vannessa Godwin  7/14/2017  1:09 AM

## 2017-07-15 PROCEDURE — 12400000 ZZH R&B MH

## 2017-07-15 PROCEDURE — 99239 HOSP IP/OBS DSCHRG MGMT >30: CPT | Performed by: NURSE PRACTITIONER

## 2017-07-15 PROCEDURE — 25000132 ZZH RX MED GY IP 250 OP 250 PS 637: Performed by: NURSE PRACTITIONER

## 2017-07-15 PROCEDURE — HZ2ZZZZ DETOXIFICATION SERVICES FOR SUBSTANCE ABUSE TREATMENT: ICD-10-PCS | Performed by: NURSE PRACTITIONER

## 2017-07-15 RX ORDER — BUSPIRONE HYDROCHLORIDE 5 MG/1
5 TABLET ORAL 3 TIMES DAILY
Qty: 90 TABLET | Refills: 0 | Status: SHIPPED | OUTPATIENT
Start: 2017-07-15 | End: 2018-03-15

## 2017-07-15 RX ORDER — HYDROXYZINE HYDROCHLORIDE 50 MG/1
50 TABLET, FILM COATED ORAL 2 TIMES DAILY
Qty: 60 TABLET | Refills: 0 | Status: SHIPPED | OUTPATIENT
Start: 2017-07-15 | End: 2018-03-15

## 2017-07-15 RX ORDER — NALTREXONE HYDROCHLORIDE 50 MG/1
50 TABLET, FILM COATED ORAL DAILY
Qty: 30 TABLET | Refills: 0 | Status: ON HOLD | OUTPATIENT
Start: 2017-07-15 | End: 2019-09-03

## 2017-07-15 RX ORDER — PRAZOSIN HYDROCHLORIDE 5 MG/1
5 CAPSULE ORAL AT BEDTIME
Qty: 30 CAPSULE | Refills: 0 | Status: SHIPPED | OUTPATIENT
Start: 2017-07-15 | End: 2018-03-15

## 2017-07-15 RX ORDER — HYDROXYZINE PAMOATE 50 MG/1
50 CAPSULE ORAL EVERY 4 HOURS PRN
Status: DISCONTINUED | OUTPATIENT
Start: 2017-07-15 | End: 2017-07-16 | Stop reason: HOSPADM

## 2017-07-15 RX ORDER — DULOXETIN HYDROCHLORIDE 60 MG/1
60 CAPSULE, DELAYED RELEASE ORAL DAILY
Qty: 30 CAPSULE | Refills: 0 | Status: ON HOLD | OUTPATIENT
Start: 2017-07-15 | End: 2019-09-03

## 2017-07-15 RX ORDER — TRAZODONE HYDROCHLORIDE 50 MG/1
50-100 TABLET, FILM COATED ORAL
Qty: 60 TABLET | Refills: 0 | Status: SHIPPED | OUTPATIENT
Start: 2017-07-15

## 2017-07-15 RX ADMIN — OLANZAPINE 5 MG: 5 TABLET, FILM COATED ORAL at 13:08

## 2017-07-15 RX ADMIN — PRAZOSIN HYDROCHLORIDE 5 MG: 5 CAPSULE ORAL at 21:10

## 2017-07-15 RX ADMIN — DULOXETINE HYDROCHLORIDE 60 MG: 60 CAPSULE, DELAYED RELEASE ORAL at 08:53

## 2017-07-15 RX ADMIN — ACETAMINOPHEN 650 MG: 325 TABLET, FILM COATED ORAL at 14:55

## 2017-07-15 RX ADMIN — BUSPIRONE HYDROCHLORIDE 5 MG: 5 TABLET ORAL at 13:08

## 2017-07-15 RX ADMIN — HYDROXYZINE HYDROCHLORIDE 50 MG: 25 TABLET ORAL at 21:10

## 2017-07-15 RX ADMIN — BUSPIRONE HYDROCHLORIDE 5 MG: 5 TABLET ORAL at 21:10

## 2017-07-15 RX ADMIN — HYDROXYZINE HYDROCHLORIDE 50 MG: 25 TABLET ORAL at 08:53

## 2017-07-15 RX ADMIN — NALTREXONE HYDROCHLORIDE 50 MG: 50 TABLET, FILM COATED ORAL at 08:53

## 2017-07-15 RX ADMIN — TRAZODONE HYDROCHLORIDE 100 MG: 50 TABLET ORAL at 21:16

## 2017-07-15 RX ADMIN — BUSPIRONE HYDROCHLORIDE 5 MG: 5 TABLET ORAL at 08:53

## 2017-07-15 RX ADMIN — HYDROXYZINE PAMOATE 50 MG: 50 CAPSULE ORAL at 16:27

## 2017-07-15 ASSESSMENT — ACTIVITIES OF DAILY LIVING (ADL)
LAUNDRY: UNABLE TO COMPLETE
GROOMING: INDEPENDENT
GROOMING: SHOWER
DRESS: INDEPENDENT;SCRUBS (BEHAVIORAL HEALTH)
ORAL_HYGIENE: INDEPENDENT

## 2017-07-15 NOTE — PLAN OF CARE
Face to face end of shift report received from Emily MONTES RN. Rounding completed. Patient observed sitting in bedroom awake. No requests at this time.     Smiley Marquez  7/15/2017  10:20 AM

## 2017-07-15 NOTE — PLAN OF CARE
"Problem: General Plan of Care (Inpatient Behavioral)  Goal: Individualization/Patient Specific Goal (IP Behavioral)  The patient and/or their representative will achieve their patient-specific goals related to the plan of care.    The patient-specific goals include:   Patient will verbalize a decrease in suicidal ideation prior to discharge.   Patient will contract for safety if having thoughts of self harm.   Patient will verbalize an acceptable level of pain while on unit.   Patient will have a safe withdrawal from alcohol.   Patient will comply with treatment team recommendations while on unit.   Outcome: Improving  Patient friendly and cooperative with assessment this shift. Full range affect, clear speech and making good eye contact during conversation with this writer. Reports \"some anxiety but it's okay\" and \"my depression gets better every day. Denies all other criteria this shift. Patient attending groups for short periods this morning, otherwise in bedroom reading. Reports \"whatever they gave me for constipation yesterday is working so I'm trying to stay close to the bathroom\". Reports \"feeling a little better\" more this afternoon. While talking to this writer about discharge tomorrow, patient states \"I'm so happy right now. I have my appointments set, I'm going to do my Rule 25 and then I want to go to [facility]. I finished my goal here and I'm ready to move on to the next step.\" Showered this shift and compliant with all medications.   1308- Requested/Received PRN Zyprexa 5 mg for anxiety rated 7/10 d/t \"so much on my mind\". Reports relief within the hour. In day room coloring at end of shift.     Problem: Fall Risk (Adult)  Goal: Identify Related Risk Factors and Signs and Symptoms  Related risk factors and signs and symptoms are identified upon initiation of [employer] Clinical Practice Guideline (CPG)   Continue to monitor at this time.   Goal: Absence of Falls  Patient will be free from fall " during hospital stay.  Pt will use tap bell as instructed.   No concerns at this time.

## 2017-07-15 NOTE — PLAN OF CARE
Problem: General Plan of Care (Inpatient Behavioral)  Goal: Individualization/Patient Specific Goal (IP Behavioral)  The patient and/or their representative will achieve their patient-specific goals related to the plan of care.    The patient-specific goals include:   Patient will verbalize a decrease in suicidal ideation prior to discharge.   Patient will contract for safety if having thoughts of self harm.   Patient will verbalize an acceptable level of pain while on unit.   Patient will have a safe withdrawal from alcohol.   Patient will comply with treatment team recommendations while on unit.   Pt is cooperative with writer.  She makes good eye contact during nursing assessment.  Pt c/o high anxiety this shift and some low depression.  Pt was also complaining of constipation, stating she has not had a bowel movement in 6 days. Pt denied all other criteria.  Writer administered PRN Zyprexa 2.5mg at 1743 for anxiety.  Pt has been attending groups this shift.  Pt became upset later on in the shift.  There had been a control team on another pt this shift and this situation upset the pt and caused high anxiety.  Pt states she no longer wantes to be here and would like to go to her sisters house.  Pt put in her 12 hour intent to leave at 2109.  Writer called oncall NP Dee Dee Chu.  NP stated to let the oncoming dayshift NP make a decsion reguarding the 12 hour intent.  NP gave verbal orders: Zyprexa 10mg once give now;zyprexa 5mg PRN BID, d/c zyprexa 2.5mg BID PRN. Writer administered one time order of magcitate at 2054 for constipation.  Writer encouraged pt to drink water and walk.  Writer administered Zyprexa 10mg one time order for anxiety at 2139.  Pt later stated the PRN Zyprexa was effective. Pt cooperative with all medications this shift.
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
## 2017-07-15 NOTE — PLAN OF CARE
Problem: General Plan of Care (Inpatient Behavioral)  Goal: Individualization/Patient Specific Goal (IP Behavioral)  The patient and/or their representative will achieve their patient-specific goals related to the plan of care.    The patient-specific goals include:   Patient will verbalize a decrease in suicidal ideation prior to discharge.   Patient will contract for safety if having thoughts of self harm.   Patient will verbalize an acceptable level of pain while on unit.   Patient will have a safe withdrawal from alcohol.   Patient will comply with treatment team recommendations while on unit.   0000 in bed with easy respirations, presenting [id number] patient continues to sleep this shift.

## 2017-07-15 NOTE — PROGRESS NOTES
Face to face end of shift report received from miguel angel trujillo at 2300 report.. Rounding completed. Patient observed.     Sarah Perez  7/15/2017  1:19 AM

## 2017-07-16 VITALS
BODY MASS INDEX: 21.5 KG/M2 | OXYGEN SATURATION: 98 % | TEMPERATURE: 97.5 F | WEIGHT: 137 LBS | HEART RATE: 65 BPM | HEIGHT: 67 IN | SYSTOLIC BLOOD PRESSURE: 120 MMHG | RESPIRATION RATE: 12 BRPM | DIASTOLIC BLOOD PRESSURE: 77 MMHG

## 2017-07-16 PROCEDURE — 25000125 ZZHC RX 250: Performed by: NURSE PRACTITIONER

## 2017-07-16 PROCEDURE — 25000132 ZZH RX MED GY IP 250 OP 250 PS 637: Performed by: NURSE PRACTITIONER

## 2017-07-16 RX ADMIN — DULOXETINE HYDROCHLORIDE 60 MG: 60 CAPSULE, DELAYED RELEASE ORAL at 08:26

## 2017-07-16 RX ADMIN — NALTREXONE HYDROCHLORIDE 50 MG: 50 TABLET, FILM COATED ORAL at 08:27

## 2017-07-16 RX ADMIN — BUSPIRONE HYDROCHLORIDE 5 MG: 5 TABLET ORAL at 08:27

## 2017-07-16 RX ADMIN — ONDANSETRON 4 MG: 4 TABLET, ORALLY DISINTEGRATING ORAL at 07:52

## 2017-07-16 RX ADMIN — BUSPIRONE HYDROCHLORIDE 5 MG: 5 TABLET ORAL at 12:19

## 2017-07-16 RX ADMIN — HYDROXYZINE HYDROCHLORIDE 50 MG: 25 TABLET ORAL at 08:27

## 2017-07-16 NOTE — PLAN OF CARE
Face to face end of shift report received from Sarah ALBERTO RN. Rounding completed. Patient observed in the lounge.     Ivan Zuñiga  7/16/2017  9:36 AM

## 2017-07-16 NOTE — PROGRESS NOTES
Face to face end of shift report received from miguel angel trujillo at 2300 report.. Rounding completed. Patient observed.     Sarah Perez  7/16/2017  1:55 AM

## 2017-07-16 NOTE — PLAN OF CARE
Problem: General Plan of Care (Inpatient Behavioral)  Goal: Individualization/Patient Specific Goal (IP Behavioral)  The patient and/or their representative will achieve their patient-specific goals related to the plan of care.    The patient-specific goals include:   Patient will verbalize a decrease in suicidal ideation prior to discharge.   Patient will contract for safety if having thoughts of self harm.   Patient will verbalize an acceptable level of pain while on unit.   Patient will have a safe withdrawal from alcohol.   Patient will comply with treatment team recommendations while on unit.   Pt is pleasant and cooperative with writer.  She admits to having high anxiety and some low depression this shift.  Denies all other criteria.  Pt been coloring.  She states it helps her with her anxiety.  Writer did administer PRN atarax 50mg at 1627 for anxiety.  Pt later reported the PRN was effective.  Pt states she is excited for discharge tomorrow. Cooperative with all medications.

## 2017-07-16 NOTE — PLAN OF CARE
Problem: General Plan of Care (Inpatient Behavioral)  Goal: Individualization/Patient Specific Goal (IP Behavioral)  The patient and/or their representative will achieve their patient-specific goals related to the plan of care.    The patient-specific goals include:   Patient will verbalize a decrease in suicidal ideation prior to discharge.   Patient will contract for safety if having thoughts of self harm.   Patient will verbalize an acceptable level of pain while on unit.   Patient will have a safe withdrawal from alcohol.   Patient will comply with treatment team recommendations while on unit.   0000 in bed with easy respirations, presenting sleeping.[id number] patient continues to sleep at this time.

## 2017-07-16 NOTE — PLAN OF CARE
Discharge Note    Patient Discharged to home on 7/16/2017 1:27 PM via Private Car accompanied by staff to the door and parents to home.     Patient informed of discharge instructions in AVS. patient verbalizes understanding and denies having any questions pertaining to AVS. Patient stable at time of discharge. Patient denies SI, HI, and thoughts of self harm at time of discharge. All personal belongings returned to patient. Discharge prescriptions sent to  [facility].  Psych evaluation, history and physical, AVS, and discharge summary faxed to next level of care- [facility] and the [facility] via fax by this writer.     Ivan Panchal-Braxton  7/16/2017  1:27 PM

## 2017-07-16 NOTE — PLAN OF CARE
Problem: General Plan of Care (Inpatient Behavioral)  Goal: Individualization/Patient Specific Goal (IP Behavioral)  The patient and/or their representative will achieve their patient-specific goals related to the plan of care.    The patient-specific goals include:   Patient will verbalize a decrease in suicidal ideation prior to discharge.   Patient will contract for safety if having thoughts of self harm.   Patient will verbalize an acceptable level of pain while on unit.   Patient will have a safe withdrawal from alcohol.   Patient will comply with treatment team recommendations while on unit.   Outcome: Improving  Patient endorsed some anxiety. She denied depression, HI/SI and hallucinations. She seemed to display insight. She asked appropriate questions about discharge. Patient said she felt this admit was very beneficial to her because she better than she has in years. Patient said the meds are helping her and she intends to keep taking them when she gets home. Patient said she is sleeping well. She is appropriate dressed and clean. Patient stated she feels she learn adequate skills since being here. She also agreed to that if she gets home and has similar thoughts which she had prior to admit, she will call her mom for support. She denied pain

## 2017-07-16 NOTE — PLAN OF CARE
Face to face end of shift report received from JAZMIN Reis RN. Rounding completed. Patient observed up in [facility].     Emily Purdy  7/15/2017  10:13 PM

## 2017-07-17 NOTE — PLAN OF CARE
Problem: General Plan of Care (Inpatient Behavioral)  Goal: Patient Schedule  Patient s Schedule:   Occupational Therapy Discharge Summary     Reason for therapy discharge:    Discharged to home with outpatient therapy.     Progress towards therapy goal(s). See goals on Care Plan in [facility] electronic health record for goal details.  Goals partially met.  Barriers to achieving goals:   discharge from facility.     Therapy recommendation(s):    No further therapy is recommended.

## 2018-03-15 ENCOUNTER — HOSPITAL ENCOUNTER (INPATIENT)
Facility: CLINIC | Age: 38
LOS: 7 days | Discharge: HOME | DRG: 885 | End: 2018-03-22
Attending: PSYCHIATRY & NEUROLOGY | Admitting: PSYCHIATRY & NEUROLOGY
Payer: COMMERCIAL

## 2018-03-15 DIAGNOSIS — G89.29 OTHER CHRONIC PAIN: Primary | ICD-10-CM

## 2018-03-15 DIAGNOSIS — F32.9 PROLONGED DEPRESSIVE REACTION: ICD-10-CM

## 2018-03-15 DIAGNOSIS — K59.00 CONSTIPATION, UNSPECIFIED CONSTIPATION TYPE: ICD-10-CM

## 2018-03-15 DIAGNOSIS — B96.89 BACTERIAL VAGINOSIS: ICD-10-CM

## 2018-03-15 DIAGNOSIS — K21.9 GASTROESOPHAGEAL REFLUX DISEASE, ESOPHAGITIS PRESENCE NOT SPECIFIED: ICD-10-CM

## 2018-03-15 DIAGNOSIS — F41.9 ANXIETY: ICD-10-CM

## 2018-03-15 DIAGNOSIS — F10.10 ALCOHOL ABUSE, CONTINUOUS: ICD-10-CM

## 2018-03-15 DIAGNOSIS — N76.0 BACTERIAL VAGINOSIS: ICD-10-CM

## 2018-03-15 DIAGNOSIS — R45.851 SUICIDAL IDEATION: ICD-10-CM

## 2018-03-15 DIAGNOSIS — E56.9 VITAMIN DEFICIENCY: ICD-10-CM

## 2018-03-15 LAB
ALCOHOL BREATH TEST: 0 (ref 0–0.01)
AMPHETAMINES UR QL SCN: NEGATIVE
BARBITURATES UR QL: NEGATIVE
BENZODIAZ UR QL: NEGATIVE
CANNABINOIDS UR QL SCN: POSITIVE
COCAINE UR QL: NEGATIVE
ETHANOL UR QL SCN: NEGATIVE
HCG UR QL: NEGATIVE
OPIATES UR QL SCN: NEGATIVE

## 2018-03-15 PROCEDURE — 80307 DRUG TEST PRSMV CHEM ANLYZR: CPT | Performed by: EMERGENCY MEDICINE

## 2018-03-15 PROCEDURE — 81025 URINE PREGNANCY TEST: CPT | Performed by: EMERGENCY MEDICINE

## 2018-03-15 PROCEDURE — 25000132 ZZH RX MED GY IP 250 OP 250 PS 637: Performed by: EMERGENCY MEDICINE

## 2018-03-15 PROCEDURE — HZ2ZZZZ DETOXIFICATION SERVICES FOR SUBSTANCE ABUSE TREATMENT: ICD-10-PCS | Performed by: EMERGENCY MEDICINE

## 2018-03-15 PROCEDURE — 12400001 ZZH R&B MH UMMC

## 2018-03-15 PROCEDURE — 80320 DRUG SCREEN QUANTALCOHOLS: CPT | Performed by: EMERGENCY MEDICINE

## 2018-03-15 PROCEDURE — 82075 ASSAY OF BREATH ETHANOL: CPT | Performed by: EMERGENCY MEDICINE

## 2018-03-15 PROCEDURE — 25000132 ZZH RX MED GY IP 250 OP 250 PS 637: Performed by: PSYCHIATRY & NEUROLOGY

## 2018-03-15 PROCEDURE — 99284 EMERGENCY DEPT VISIT MOD MDM: CPT | Mod: Z6 | Performed by: EMERGENCY MEDICINE

## 2018-03-15 PROCEDURE — 99285 EMERGENCY DEPT VISIT HI MDM: CPT | Performed by: EMERGENCY MEDICINE

## 2018-03-15 RX ORDER — LANOLIN ALCOHOL/MO/W.PET/CERES
100 CREAM (GRAM) TOPICAL DAILY
Status: DISPENSED | OUTPATIENT
Start: 2018-03-16 | End: 2018-03-19

## 2018-03-15 RX ORDER — FOLIC ACID 1 MG/1
1 TABLET ORAL DAILY
Status: DISCONTINUED | OUTPATIENT
Start: 2018-03-16 | End: 2018-03-22 | Stop reason: HOSPADM

## 2018-03-15 RX ORDER — BISACODYL 10 MG
10 SUPPOSITORY, RECTAL RECTAL DAILY PRN
Status: DISCONTINUED | OUTPATIENT
Start: 2018-03-15 | End: 2018-03-22 | Stop reason: HOSPADM

## 2018-03-15 RX ORDER — BUSPIRONE HYDROCHLORIDE 15 MG/1
15 TABLET ORAL 3 TIMES DAILY
Status: DISCONTINUED | OUTPATIENT
Start: 2018-03-15 | End: 2018-03-22 | Stop reason: HOSPADM

## 2018-03-15 RX ORDER — LORAZEPAM 0.5 MG/1
0.5 TABLET ORAL ONCE
Status: COMPLETED | OUTPATIENT
Start: 2018-03-15 | End: 2018-03-15

## 2018-03-15 RX ORDER — ACETAMINOPHEN 500 MG
1000 TABLET ORAL ONCE
Status: COMPLETED | OUTPATIENT
Start: 2018-03-15 | End: 2018-03-15

## 2018-03-15 RX ORDER — TRAZODONE HYDROCHLORIDE 50 MG/1
50-100 TABLET, FILM COATED ORAL
Status: DISCONTINUED | OUTPATIENT
Start: 2018-03-15 | End: 2018-03-22 | Stop reason: HOSPADM

## 2018-03-15 RX ORDER — LORAZEPAM 1 MG/1
1 TABLET ORAL ONCE
Status: COMPLETED | OUTPATIENT
Start: 2018-03-15 | End: 2018-03-15

## 2018-03-15 RX ORDER — HYDROXYZINE HYDROCHLORIDE 25 MG/1
25 TABLET, FILM COATED ORAL EVERY 4 HOURS PRN
Status: DISCONTINUED | OUTPATIENT
Start: 2018-03-15 | End: 2018-03-22 | Stop reason: HOSPADM

## 2018-03-15 RX ORDER — ONDANSETRON 4 MG/1
4-8 TABLET, ORALLY DISINTEGRATING ORAL EVERY 8 HOURS PRN
Status: DISCONTINUED | OUTPATIENT
Start: 2018-03-15 | End: 2018-03-16

## 2018-03-15 RX ORDER — DULOXETIN HYDROCHLORIDE 60 MG/1
60 CAPSULE, DELAYED RELEASE ORAL DAILY
Status: DISCONTINUED | OUTPATIENT
Start: 2018-03-16 | End: 2018-03-22 | Stop reason: HOSPADM

## 2018-03-15 RX ORDER — FLUTICASONE PROPIONATE 50 MCG
2 SPRAY, SUSPENSION (ML) NASAL DAILY
Status: DISCONTINUED | OUTPATIENT
Start: 2018-03-16 | End: 2018-03-22 | Stop reason: HOSPADM

## 2018-03-15 RX ORDER — ALUMINA, MAGNESIA, AND SIMETHICONE 2400; 2400; 240 MG/30ML; MG/30ML; MG/30ML
30 SUSPENSION ORAL EVERY 4 HOURS PRN
Status: DISCONTINUED | OUTPATIENT
Start: 2018-03-15 | End: 2018-03-22 | Stop reason: HOSPADM

## 2018-03-15 RX ORDER — BUSPIRONE HYDROCHLORIDE 15 MG/1
15 TABLET ORAL 3 TIMES DAILY
Status: ON HOLD | COMMUNITY
End: 2018-03-22

## 2018-03-15 RX ORDER — DIAZEPAM 5 MG
5-20 TABLET ORAL EVERY 30 MIN PRN
Status: DISCONTINUED | OUTPATIENT
Start: 2018-03-15 | End: 2018-03-22 | Stop reason: HOSPADM

## 2018-03-15 RX ORDER — ACETAMINOPHEN 325 MG/1
650 TABLET ORAL EVERY 4 HOURS PRN
Status: DISCONTINUED | OUTPATIENT
Start: 2018-03-15 | End: 2018-03-22 | Stop reason: HOSPADM

## 2018-03-15 RX ORDER — OLANZAPINE 10 MG/2ML
10 INJECTION, POWDER, FOR SOLUTION INTRAMUSCULAR
Status: DISCONTINUED | OUTPATIENT
Start: 2018-03-15 | End: 2018-03-19

## 2018-03-15 RX ORDER — METRONIDAZOLE 500 MG/1
500 TABLET ORAL 2 TIMES DAILY
Status: DISCONTINUED | OUTPATIENT
Start: 2018-03-15 | End: 2018-03-21

## 2018-03-15 RX ORDER — NALTREXONE HYDROCHLORIDE 50 MG/1
50 TABLET, FILM COATED ORAL DAILY
Status: DISCONTINUED | OUTPATIENT
Start: 2018-03-16 | End: 2018-03-22 | Stop reason: HOSPADM

## 2018-03-15 RX ORDER — MULTIPLE VITAMINS W/ MINERALS TAB 9MG-400MCG
1 TAB ORAL DAILY
Status: DISCONTINUED | OUTPATIENT
Start: 2018-03-16 | End: 2018-03-22 | Stop reason: HOSPADM

## 2018-03-15 RX ORDER — METRONIDAZOLE 500 MG/1
500 TABLET ORAL 2 TIMES DAILY
Status: ON HOLD | COMMUNITY
End: 2018-03-22

## 2018-03-15 RX ORDER — OLANZAPINE 10 MG/1
10 TABLET ORAL
Status: DISCONTINUED | OUTPATIENT
Start: 2018-03-15 | End: 2018-03-22 | Stop reason: HOSPADM

## 2018-03-15 RX ADMIN — METRONIDAZOLE 500 MG: 500 TABLET ORAL at 23:08

## 2018-03-15 RX ADMIN — BUSPIRONE HYDROCHLORIDE 15 MG: 15 TABLET ORAL at 23:08

## 2018-03-15 RX ADMIN — HYDROXYZINE HYDROCHLORIDE 25 MG: 25 TABLET ORAL at 23:08

## 2018-03-15 RX ADMIN — LORAZEPAM 1 MG: 1 TABLET ORAL at 18:10

## 2018-03-15 RX ADMIN — TRAZODONE HYDROCHLORIDE 100 MG: 50 TABLET ORAL at 23:10

## 2018-03-15 RX ADMIN — LORAZEPAM 0.5 MG: 0.5 TABLET ORAL at 16:05

## 2018-03-15 RX ADMIN — ACETAMINOPHEN 1000 MG: 500 TABLET ORAL at 17:49

## 2018-03-15 ASSESSMENT — ENCOUNTER SYMPTOMS
NAUSEA: 0
DIARRHEA: 0
ABDOMINAL PAIN: 0
PALPITATIONS: 0
DYSURIA: 0
DYSPHORIC MOOD: 1
SHORTNESS OF BREATH: 0
COUGH: 0
CHILLS: 0
FEVER: 0
HALLUCINATIONS: 0

## 2018-03-15 ASSESSMENT — ACTIVITIES OF DAILY LIVING (ADL)
LAUNDRY: WITH SUPERVISION
GROOMING: INDEPENDENT
ORAL_HYGIENE: INDEPENDENT
DRESS: INDEPENDENT

## 2018-03-15 NOTE — ED PROVIDER NOTES
History     Chief Complaint   Patient presents with     Suicidal     thoughts of hurting self by taking pills or cutting self with scissor; Here from [facility] where she was snt to treat ETOH withdrawal but answered yes to SI questions and so was sent here.      Withdrawal     last drink was on Tuesday, drinks up to 1 liter hard liquor daily; withdrawal symptoms include nausea, received 4 mg zofran IM in ambulance [facility]; anxious, headache, tremors, teethgrinding; sweats     HPI  37-year-old female with history of substance abuse and major depressive disorder arriving today to the emergency department from detox for evaluation of suicidal ideation.  The patient reports heavy drinking over the past several months greater than 1 L of vodka per day.  She proceeded to rehab 3 days ago however with development of withdrawal-like symptoms she was transferred to detox.  There is part of her initial assessment for safety the patient could not contract for safety and was deferred to the emergency department due to concern of suicidal ideation.  Upon arrival the patient reports ongoing thoughts of self-harm for which she states she might stab herself.  She states she has had poor sleep lack of appetite and feels depressed.  The patient states she has had prior suicide attempts numbering approximately 15 mostly by overdose.  She reports no thoughts of harm to others.  She is not following with a therapist on a regular basis.  She reports no active medical problems including fever, chills, headache, nausea, vomiting, melena, dysuria.  She reports history of endometriosis with no active treatment on a daily basis.  Current withdrawal symptoms are described as mild nausea and tremors.  She reports no history of delirium tremens or withdrawal type seizures.    I have reviewed the Medications, Allergies, Past Medical and Surgical History, and Social History in the Epic system.    Review of Systems   Constitutional:  Negative for chills and fever.   Respiratory: Negative for cough and shortness of breath.    Cardiovascular: Negative for chest pain and palpitations.   Gastrointestinal: Negative for abdominal pain, diarrhea and nausea.   Genitourinary: Negative for dysuria.   Psychiatric/Behavioral: Positive for dysphoric mood and suicidal ideas. Negative for hallucinations.       Physical Exam   BP: 143/83  Pulse: 56  Temp: 98.5  F (36.9  C)  Resp: 16  SpO2: 97 %      Physical Exam   Constitutional: She is oriented to person, place, and time. She appears distressed.   HENT:   Head: Normocephalic and atraumatic.   Mouth/Throat: Oropharynx is clear and moist.   Eyes: Conjunctivae are normal. Pupils are equal, round, and reactive to light.   Cardiovascular: Normal rate, regular rhythm and normal heart sounds.    Pulmonary/Chest: Effort normal. No respiratory distress. She has no wheezes. She has no rales.   Abdominal: Soft. She exhibits no distension.   Neurological: She is alert and oriented to person, place, and time. No cranial nerve deficit.   Skin: No rash noted. She is not diaphoretic.   Psychiatric: Her speech is normal. Her affect is labile. Thought content is not delusional. Cognition and memory are normal. She exhibits a depressed mood. She expresses suicidal ideation. She expresses suicidal plans. She expresses no homicidal plans.       ED Course     ED Course     Procedures         Labs Ordered and Resulted from Time of ED Arrival Up to the Time of Departure from the ED   ALCOHOL BREATH TEST POCT - Normal   DRUG ABUSE SCREEN 6 CHEM DEP URINE ([facility])   HCG QUALITATIVE URINE            Assessments & Plan (with Medical Decision Making)     37-year-old female with history of major depressive disorder and alcohol misuse disorder arriving today to the emergency department with ongoing suicidal ideation.  Upon arrival this patient noted to be alert she is currently afebrile and hemodynamically stable.  She is tearful throughout  my exam with poor eye contact and flat affect.  She does report ongoing thoughts of self-harm or suicide with plan to stab herself.  I tells her that this patient has suffered from depression and substance abuse since approximately 14 years of age.  She feels unsafe proceeding to home and I would agree at this time on a voluntary basis she would benefit from inpatient assistance of depression.  Regarding withdrawal she was given Ativan in the emergency department however at this time has only minor trauma with no active nausea, no hallucinations, no tachycardia or hypertension.  I believe she may be set up on a CIWA protocol but does not require further active medical management at this time.    I have reviewed the nursing notes.    I have reviewed the findings, diagnosis, plan and need for follow up with the patient.    New Prescriptions    No medications on file       Final diagnoses:   Suicidal ideation       3/15/2018   [facility], [place], EMERGENCY DEPARTMENT     Michael Palacios MD  03/15/18 [id number]

## 2018-03-15 NOTE — PHARMACY-ADMISSION MEDICATION HISTORY
Admission medication history interview status for the 3/15/2018 admission is complete. See Epic admission navigator for allergy information, pharmacy, prior to admission medications and immunization status.     Medication history interview sources:  Patient's prescription bottles, MAR from [facility]    Changes made to PTA medication list (reason)  Added: metronidazole  Deleted: hydroxyzine, valacyclovir  Changed: buspirone (dose per prescription bottle)    Additional medication history information (including reliability of information, actions taken by pharmacist): The writer reviewed the patient's prescription bottles and her MAR from [facility]. The prescription for a metronidazole course was prescribed on 03/03/18 for one week. It appears the patient did not complete the course as there were still eleven tablets left in the prescription bottles.  This writer returned the prescription bottles to the patient's nurse in the ER.      Prior to Admission medications    Medication Sig Last Dose Taking? Auth Provider   metroNIDAZOLE (FLAGYL) 500 MG tablet Take 500 mg by mouth 2 times daily 3/15/2018 at AM Yes Unknown, Entered By History   busPIRone (BUSPAR) 15 MG tablet Take 15 mg by mouth 3 times daily 3/15/2018 at 1230 Yes Unknown, Entered By History   DULoxetine (CYMBALTA) 60 MG EC capsule Take 1 capsule (60 mg) by mouth daily 3/15/2018 at AM Yes Dana Victoria NP   traZODone (DESYREL) 50 MG tablet Take 1-2 tablets ( mg) by mouth nightly as needed for sleep 3/14/2018 at PM Yes Dana Victoria NP   naltrexone (DEPADE;REVIA) 50 MG tablet Take 1 tablet (50 mg) by mouth daily 3/15/2018 at AM Yes Dana Victoria NP   ondansetron (ZOFRAN-ODT) 4 MG disintegrating tablet Take 1-2 tablets (4-8 mg) by mouth every 8 hours as needed for nausea Dissolve ON the tongue. 3/15/2018 at Unknown time Yes Rajeev Temple MD   fluticasone (FLONASE) 50 MCG/ACT nasal spray Spray 2 sprays into both nostrils  daily 3/15/2018 at AM Yes Reported, Patient       Medication history completed by:  Delia Baez, PharmD, BCPP  Behavioral ER Pharmacist  [phone]

## 2018-03-15 NOTE — ED NOTES
Bed: ED10  Expected date:   Expected time:   Means of arrival:   Comments:  [address]  36 yo female SI, cooperative

## 2018-03-15 NOTE — IP AVS SNAPSHOT
[address] [address]    Phone:  [phone]                                       After Visit Summary   3/15/2018    Lorena Holden    MRN: [id number]           After Visit Summary Signature Page     I have received my discharge instructions, and my questions have been answered. I have discussed any challenges I see with this plan with the nurse or doctor.    ..........................................................................................................................................  Patient/Patient Representative Signature      ..........................................................................................................................................  Patient Representative Print Name and Relationship to Patient    ..................................................               ................................................  Date                                            Time    ..........................................................................................................................................  Reviewed by Signature/Title    ...................................................              ..............................................  Date                                                            Time

## 2018-03-16 LAB
ALBUMIN SERPL-MCNC: 3.7 G/DL (ref 3.4–5)
ALP SERPL-CCNC: 59 U/L (ref 40–150)
ALT SERPL W P-5'-P-CCNC: 18 U/L (ref 0–50)
ANION GAP SERPL CALCULATED.3IONS-SCNC: 8 MMOL/L (ref 3–14)
AST SERPL W P-5'-P-CCNC: 17 U/L (ref 0–45)
BASOPHILS # BLD AUTO: 0 10E9/L (ref 0–0.2)
BASOPHILS NFR BLD AUTO: 0.5 %
BILIRUB SERPL-MCNC: 0.7 MG/DL (ref 0.2–1.3)
BUN SERPL-MCNC: 11 MG/DL (ref 7–30)
CALCIUM SERPL-MCNC: 8.2 MG/DL (ref 8.5–10.1)
CHLORIDE SERPL-SCNC: 104 MMOL/L (ref 94–109)
CHOLEST SERPL-MCNC: 227 MG/DL
CO2 SERPL-SCNC: 27 MMOL/L (ref 20–32)
CREAT SERPL-MCNC: 0.73 MG/DL (ref 0.52–1.04)
DIFFERENTIAL METHOD BLD: NORMAL
EOSINOPHIL # BLD AUTO: 0.4 10E9/L (ref 0–0.7)
EOSINOPHIL NFR BLD AUTO: 8.2 %
ERYTHROCYTE [DISTWIDTH] IN BLOOD BY AUTOMATED COUNT: 12.7 % (ref 10–15)
GFR SERPL CREATININE-BSD FRML MDRD: 89 ML/MIN/1.7M2
GGT SERPL-CCNC: 14 U/L (ref 0–40)
GLUCOSE SERPL-MCNC: 76 MG/DL (ref 70–99)
HCT VFR BLD AUTO: 42.1 % (ref 35–47)
HDLC SERPL-MCNC: 70 MG/DL
HGB BLD-MCNC: 13.9 G/DL (ref 11.7–15.7)
IMM GRANULOCYTES # BLD: 0 10E9/L (ref 0–0.4)
IMM GRANULOCYTES NFR BLD: 0.5 %
LDLC SERPL CALC-MCNC: 139 MG/DL
LYMPHOCYTES # BLD AUTO: 1.4 10E9/L (ref 0.8–5.3)
LYMPHOCYTES NFR BLD AUTO: 32.1 %
MCH RBC QN AUTO: 31.8 PG (ref 26.5–33)
MCHC RBC AUTO-ENTMCNC: 33 G/DL (ref 31.5–36.5)
MCV RBC AUTO: 96 FL (ref 78–100)
MONOCYTES # BLD AUTO: 0.6 10E9/L (ref 0–1.3)
MONOCYTES NFR BLD AUTO: 14.6 %
NEUTROPHILS # BLD AUTO: 1.9 10E9/L (ref 1.6–8.3)
NEUTROPHILS NFR BLD AUTO: 44.1 %
NONHDLC SERPL-MCNC: 157 MG/DL
NRBC # BLD AUTO: 0 10*3/UL
NRBC BLD AUTO-RTO: 0 /100
PLATELET # BLD AUTO: 200 10E9/L (ref 150–450)
POTASSIUM SERPL-SCNC: 4 MMOL/L (ref 3.4–5.3)
PROT SERPL-MCNC: 7.1 G/DL (ref 6.8–8.8)
RBC # BLD AUTO: 4.37 10E12/L (ref 3.8–5.2)
SODIUM SERPL-SCNC: 139 MMOL/L (ref 133–144)
TRIGL SERPL-MCNC: 89 MG/DL
TSH SERPL DL<=0.005 MIU/L-ACNC: 1.92 MU/L (ref 0.4–4)
WBC # BLD AUTO: 4.4 10E9/L (ref 4–11)

## 2018-03-16 PROCEDURE — 99223 1ST HOSP IP/OBS HIGH 75: CPT | Mod: AI | Performed by: PSYCHIATRY & NEUROLOGY

## 2018-03-16 PROCEDURE — 99232 SBSQ HOSP IP/OBS MODERATE 35: CPT | Performed by: PHYSICIAN ASSISTANT

## 2018-03-16 PROCEDURE — 99207 ZZC CONSULT E&M CHANGED TO SUBSEQUENT LEVEL: CPT | Performed by: PHYSICIAN ASSISTANT

## 2018-03-16 PROCEDURE — 25000132 ZZH RX MED GY IP 250 OP 250 PS 637: Performed by: PSYCHIATRY & NEUROLOGY

## 2018-03-16 PROCEDURE — 25000125 ZZHC RX 250: Performed by: PSYCHIATRY & NEUROLOGY

## 2018-03-16 PROCEDURE — 36415 COLL VENOUS BLD VENIPUNCTURE: CPT | Performed by: PSYCHIATRY & NEUROLOGY

## 2018-03-16 PROCEDURE — 82977 ASSAY OF GGT: CPT | Performed by: PSYCHIATRY & NEUROLOGY

## 2018-03-16 PROCEDURE — 84443 ASSAY THYROID STIM HORMONE: CPT | Performed by: PSYCHIATRY & NEUROLOGY

## 2018-03-16 PROCEDURE — 80061 LIPID PANEL: CPT | Performed by: PSYCHIATRY & NEUROLOGY

## 2018-03-16 PROCEDURE — 12400001 ZZH R&B MH UMMC

## 2018-03-16 PROCEDURE — 80053 COMPREHEN METABOLIC PANEL: CPT | Performed by: PSYCHIATRY & NEUROLOGY

## 2018-03-16 PROCEDURE — 85025 COMPLETE CBC W/AUTO DIFF WBC: CPT | Performed by: PSYCHIATRY & NEUROLOGY

## 2018-03-16 RX ORDER — PROPRANOLOL HYDROCHLORIDE 10 MG/1
10 TABLET ORAL 3 TIMES DAILY PRN
Status: DISCONTINUED | OUTPATIENT
Start: 2018-03-16 | End: 2018-03-22 | Stop reason: HOSPADM

## 2018-03-16 RX ORDER — ONDANSETRON 4 MG/1
4-8 TABLET, ORALLY DISINTEGRATING ORAL EVERY 8 HOURS PRN
Status: DISCONTINUED | OUTPATIENT
Start: 2018-03-16 | End: 2018-03-16

## 2018-03-16 RX ORDER — LORAZEPAM 2 MG/ML
2 INJECTION INTRAMUSCULAR ONCE
Status: COMPLETED | OUTPATIENT
Start: 2018-03-16 | End: 2018-03-17

## 2018-03-16 RX ORDER — ONDANSETRON 2 MG/ML
4 INJECTION INTRAMUSCULAR; INTRAVENOUS EVERY 6 HOURS PRN
Status: DISCONTINUED | OUTPATIENT
Start: 2018-03-16 | End: 2018-03-18

## 2018-03-16 RX ORDER — ONDANSETRON 4 MG/1
4-8 TABLET, ORALLY DISINTEGRATING ORAL 4 TIMES DAILY PRN
Status: DISCONTINUED | OUTPATIENT
Start: 2018-03-16 | End: 2018-03-16 | Stop reason: CLARIF

## 2018-03-16 RX ORDER — IBUPROFEN 400 MG/1
400 TABLET, FILM COATED ORAL 3 TIMES DAILY PRN
Status: DISCONTINUED | OUTPATIENT
Start: 2018-03-16 | End: 2018-03-22 | Stop reason: HOSPADM

## 2018-03-16 RX ORDER — ONDANSETRON 4 MG/1
4-8 TABLET, ORALLY DISINTEGRATING ORAL EVERY 4 HOURS PRN
Status: DISCONTINUED | OUTPATIENT
Start: 2018-03-16 | End: 2018-03-22 | Stop reason: HOSPADM

## 2018-03-16 RX ORDER — OLANZAPINE 2.5 MG/1
2.5 TABLET, FILM COATED ORAL 3 TIMES DAILY PRN
Status: DISCONTINUED | OUTPATIENT
Start: 2018-03-16 | End: 2018-03-22 | Stop reason: HOSPADM

## 2018-03-16 RX ORDER — POLYETHYLENE GLYCOL 3350 17 G/17G
17 POWDER, FOR SOLUTION ORAL DAILY PRN
Status: DISCONTINUED | OUTPATIENT
Start: 2018-03-16 | End: 2018-03-22 | Stop reason: HOSPADM

## 2018-03-16 RX ADMIN — ONDANSETRON 8 MG: 4 TABLET, ORALLY DISINTEGRATING ORAL at 19:43

## 2018-03-16 RX ADMIN — BUSPIRONE HYDROCHLORIDE 15 MG: 15 TABLET ORAL at 12:10

## 2018-03-16 RX ADMIN — FOLIC ACID 1 MG: 1 TABLET ORAL at 12:11

## 2018-03-16 RX ADMIN — ONDANSETRON 8 MG: 4 TABLET, ORALLY DISINTEGRATING ORAL at 12:13

## 2018-03-16 RX ADMIN — FLUTICASONE PROPIONATE 2 SPRAY: 50 SPRAY, METERED NASAL at 12:57

## 2018-03-16 RX ADMIN — METRONIDAZOLE 500 MG: 500 TABLET ORAL at 12:11

## 2018-03-16 RX ADMIN — ONDANSETRON 8 MG: 4 TABLET, ORALLY DISINTEGRATING ORAL at 03:53

## 2018-03-16 RX ADMIN — NALTREXONE HYDROCHLORIDE 50 MG: 50 TABLET, FILM COATED ORAL at 12:11

## 2018-03-16 RX ADMIN — DULOXETINE HYDROCHLORIDE 60 MG: 60 CAPSULE, DELAYED RELEASE ORAL at 12:11

## 2018-03-16 RX ADMIN — MAGNESIUM HYDROXIDE 30 ML: 400 SUSPENSION ORAL at 12:57

## 2018-03-16 RX ADMIN — DIAZEPAM 5 MG: 5 TABLET ORAL at 12:54

## 2018-03-16 RX ADMIN — ONDANSETRON 8 MG: 4 TABLET, ORALLY DISINTEGRATING ORAL at 22:24

## 2018-03-16 RX ADMIN — DIAZEPAM 10 MG: 5 TABLET ORAL at 03:53

## 2018-03-16 RX ADMIN — MULTIPLE VITAMINS W/ MINERALS TAB 1 TABLET: TAB at 12:10

## 2018-03-16 RX ADMIN — BUSPIRONE HYDROCHLORIDE 15 MG: 15 TABLET ORAL at 14:08

## 2018-03-16 RX ADMIN — DIAZEPAM 10 MG: 5 TABLET ORAL at 19:43

## 2018-03-16 RX ADMIN — Medication 100 MG: at 12:11

## 2018-03-16 ASSESSMENT — ACTIVITIES OF DAILY LIVING (ADL)
HYGIENE/GROOMING: INDEPENDENT
DRESS: SCRUBS (BEHAVIORAL HEALTH);INDEPENDENT
ORAL_HYGIENE: INDEPENDENT
LAUNDRY: UNABLE TO COMPLETE

## 2018-03-16 NOTE — CONSULTS
[facility]  Internal Medicine Consult     Lorena Holden MRN# [id number]   Age: 37 year old YOB: 1980     Date of Admission: 3/15/2018  Date of Consult:  3/16/2018    Primary Care Provider: Zenon Kennedy    Requesting Service: Psychiatry  Reason for Consult: endometriosis, alcohol withdrawal      SUBJECTIVE   CC:   Hx of endometriosis, alcohol withdrawal   HPI:   Lorena Holden is a 38yo female with a hx of endometriosis, anxiety, depression, PTSD, and alcohol abuse who was admitted to [facility] for psych evaluation due to worsening depression and anxiety. Pt reports that she has been trying to stop drinking alcohol and has a bed at a treatment facility. She is actively withdrawing and feels restless. She denies any hx of seizures or DTs. She is very anxious. She has a hx of endometriosis for which she has required surgery. She reports it is mostly localized to her colon. She denies any increased BRBPR, melena, or rectal pain at this time. She has chronic pain and uses ice/heat as needed for this. We discuss that narcotics would not be a good option given her hx of alcohol abuse, she agrees. She denies any abdominal pain. She is supposed to get another surgery, but reports she is not interested in this at this time. She is not on oral contraceptives for religions reasons. She denies any chest pain, sob, or dyspnea. No other acute medical concerns.      Past Medical History:     Past Medical History:   Diagnosis Date     Anxiety      Endometriosis      Manic depression (H)      Other chronic pain     uterine pain     PTSD (post-traumatic stress disorder)          Past Surgical History:      Past Surgical History:   Procedure Laterality Date     APPENDECTOMY       BREAST SURGERY       CYSTOSCOPY N/A 11/19/2014    Procedure: CYSTOSCOPY;  Surgeon: Rajeev Temple MD;  Location:  OR     DAVINCI PELVIC PROCEDURE N/A 11/19/2014    Procedure: DAVINCI  PELVIC PROCEDURE;  Surgeon: Rajeev Temple MD;  Location:  OR     DILATION AND CURETTAGE, HYSTEROSCOPY, ABLATE ENDOMETRIUM, COMBINED N/A 11/19/2014    Procedure: COMBINED DILATION AND CURETTAGE, HYSTEROSCOPY, ABLATE ENDOMETRIUM;  Surgeon: Rajeev Temple MD;  Location:  OR     GYN SURGERY       LAPAROSCOPIC APPENDECTOMY N/A 11/19/2014    Procedure: LAPAROSCOPIC APPENDECTOMY;  Surgeon: Rajeev Temple MD;  Location:  OR         Social History:   See HPI.     Family History:   Sister- endometriosis       Allergies:     Allergies   Allergen Reactions     Bee Venom Anaphylaxis     Compazine [Prochlorperazine] Anaphylaxis     Edisylate/Maleate         Medications:   Reviewed. Please see MAR     Review of Systems:   10 point ROS of systems including Constitutional, Eyes, Respiratory, Cardiovascular, Gastroenterology, Genitourinary, Integumentary, Muscularskeletal, Psychiatric were all negative except for pertinent positives noted in my HPI.      OBJECTIVE   Physical Exam:   Vitals were reviewed  Blood pressure 130/86, pulse 69, temperature 97.9  F (36.6  C), temperature source Oral, resp. rate 16, last menstrual period 03/10/2018, SpO2 96 %.  General:alert, NAD, appears in mild withdrawal  HEENT: NCAT, anicteric sclera, EOMI, mucous membranes pink and moist  Cardiovascular: RRR, S1S2  Lungs:CTAB  Abdomen: + BS, soft with no distention and non tenderness   Vascular: no periheral edema, distal pulses palpable  Neurologic: AAO X 3, no focal deficits  Skin: no jaundice, rashes, or lesions on exposed areas of skin         Data:        Lab Results   Component Value Date     12/16/2014    Lab Results   Component Value Date    CHLORIDE 108 12/16/2014    Lab Results   Component Value Date    BUN 11 12/16/2014      Lab Results   Component Value Date    POTASSIUM 4.0 07/05/2017    Lab Results   Component Value Date    CO2 27 12/16/2014    Lab Results   Component Value Date    CR 0.6 07/05/2017         Lab Results   Component Value Date    WBC 4.4 03/16/2018    HGB 13.9 03/16/2018    HCT 42.1 03/16/2018    MCV 96 03/16/2018     03/16/2018     Lab Results   Component Value Date    WBC 4.4 03/16/2018       Assessment and Plan/Recommendations:   Lorena Holden is a 38yo female with a hx of endometriosis, anxiety, depression, PTSD, and alcohol abuse who was admitted to [facility] for psych evaluation due to worsening depression and anxiety.    Anxiety, depression, PTSD. Defer to psych, primary team.     Alcohol abuse and withdrawal. Ongoing withdrawal, mild symptoms. No hx of seizures or DTs from withdrawal. LFTs wnl.     Endometriosis. Mostly has issues in colon. Has undergone 3 surgical interventions, and is supposed to undergo a 4th, but does not want to pursue it at this time. She denies any increased in abdominal pain, cramping, BRBPR, hematochezia, or melena. Her Hgb is stable 13.9. She is HD stable. Abd exam benign. OCP is mainstay of treatment, but patient does not take due to Gnosticist reasons. No acute issues this time. Recommend following up with outpatient team on discharge. Use heat/ice as needed for pain, avoid narcotics in setting of substance use.     Bacterial vaginosis. Dx at OSH. Cont flagyl as ordered.    Currently, medically stable and I will be happy to follow up and see again for any intercurrent medical issues. Thank you for the opportunity to be a part of this patient's care.      [facility]  Internal Medicine [facility] Hospitalist  [phone]  March 16, 2018

## 2018-03-16 NOTE — PLAN OF CARE
Problem: Patient Care Overview  Goal: Team Discussion  Team Plan:   BEHAVIORAL TEAM DISCUSSION    Participants: Dr. Moore, [facility] Kimmy Fernández, RN Kae Mata  Progress: new admission  Continued Stay Criteria/Rationale: SI  Medical/Physical: etoh withdrawal  Precautions:   Behavioral Orders   Procedures     Code 1 - Restrict to Unit     Routine Programming     As clinically indicated     Status 15     Every 15 minutes.     Suicide precautions     Withdrawal precautions     Plan: [facility] to coordinate transfer to [place] when pt stable, manage meds per psychiatry, offer groups for coping skills  Rationale for change in precautions or plan: no change

## 2018-03-16 NOTE — PROGRESS NOTES
"SPIRITUAL HEALTH SERVICES Progress Note  [facility] ([facility]       DATA:    I did visit the pt this morning per Epic consult order. Pt was asleep on my first visit attempts but on my second visit attempts pt was not in a deep sleep. Pt want to be alone and want to sleep without disturbing by the staff. Pt allow me to do a brief visit with her and she said, \"I need only a prayer, so can you do it now?\" After I encouraged her with a positive and up lifting by the word of God, I asked, her personal prayer request.       INTERVENTION:    Pt want to be alone and sleep but she asked me to pray for her to get some spiritual strength from God and I offered her a prayer as she requested.       OUTCOME:    Pt want to be alone but liked to get a prayer from the .       PLAN:    I will remain open to provide spiritual care for the pt as needed.                                                                                                                                             Kiesha Mercado M.Div. (Alem), M.Th., D.Min., [facility]  Staff   Pager [phone]    "
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
## 2018-03-16 NOTE — PROGRESS NOTES
03/15/18 2031   Patient Belongings   Did you bring any home meds/supplements to the hospital?  Yes   Disposition of meds  Sent to security/pharmacy per site process   Patient Belongings body jewelry;clothing;glasses;jewelry   Disposition of Belongings Kept with patient;Locker   Belongings Search Yes   Clothing Search Yes   Second Staff Janae WU     Patient's medication was given to the nurse    Patients belongings are in locker or with pt as appropriate: glasses, body jewelry (facial piercing), 3 necklaces, 2 bracelets, journal, pants, underwear, socks, shirt, sweater, stuffed animal. Hair tie, evangelina pins, paperwork    A               Admission:  I am responsible for any personal items that are not sent to the safe or pharmacy.  [facility] is not responsible for loss, theft or damage of any property in my possession.    Signature:  _________________________________ Date: _______  Time: _____                                              Staff Signature:  ____________________________ Date: ________  Time: _____      2nd Staff person, if patient is unable/unwilling to sign:    Signature: ________________________________ Date: ________  Time: _____     Discharge:  [facility] has returned all of my personal belongings:    Signature: _________________________________ Date: ________  Time: _____                                          Staff Signature:  ____________________________ Date: ________  Time: _____
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
## 2018-03-17 LAB
ALBUMIN SERPL-MCNC: 3.8 G/DL (ref 3.4–5)
ALP SERPL-CCNC: 61 U/L (ref 40–150)
ALT SERPL W P-5'-P-CCNC: 18 U/L (ref 0–50)
ANION GAP SERPL CALCULATED.3IONS-SCNC: 8 MMOL/L (ref 3–14)
AST SERPL W P-5'-P-CCNC: 18 U/L (ref 0–45)
BILIRUB SERPL-MCNC: 0.5 MG/DL (ref 0.2–1.3)
BUN SERPL-MCNC: 12 MG/DL (ref 7–30)
CALCIUM SERPL-MCNC: 8.3 MG/DL (ref 8.5–10.1)
CHLORIDE SERPL-SCNC: 102 MMOL/L (ref 94–109)
CO2 SERPL-SCNC: 28 MMOL/L (ref 20–32)
CREAT SERPL-MCNC: 0.77 MG/DL (ref 0.52–1.04)
ERYTHROCYTE [DISTWIDTH] IN BLOOD BY AUTOMATED COUNT: 12.3 % (ref 10–15)
GFR SERPL CREATININE-BSD FRML MDRD: 84 ML/MIN/1.7M2
GLUCOSE SERPL-MCNC: 79 MG/DL (ref 70–99)
HCT VFR BLD AUTO: 41.5 % (ref 35–47)
HGB BLD-MCNC: 14 G/DL (ref 11.7–15.7)
MCH RBC QN AUTO: 31.9 PG (ref 26.5–33)
MCHC RBC AUTO-ENTMCNC: 33.7 G/DL (ref 31.5–36.5)
MCV RBC AUTO: 95 FL (ref 78–100)
PLATELET # BLD AUTO: 204 10E9/L (ref 150–450)
POTASSIUM SERPL-SCNC: 3.6 MMOL/L (ref 3.4–5.3)
PROT SERPL-MCNC: 7.2 G/DL (ref 6.8–8.8)
RBC # BLD AUTO: 4.39 10E12/L (ref 3.8–5.2)
SODIUM SERPL-SCNC: 138 MMOL/L (ref 133–144)
WBC # BLD AUTO: 5.6 10E9/L (ref 4–11)

## 2018-03-17 PROCEDURE — 36415 COLL VENOUS BLD VENIPUNCTURE: CPT | Performed by: HOSPITALIST

## 2018-03-17 PROCEDURE — 25000125 ZZHC RX 250: Performed by: PSYCHIATRY & NEUROLOGY

## 2018-03-17 PROCEDURE — 25000132 ZZH RX MED GY IP 250 OP 250 PS 637: Performed by: PSYCHIATRY & NEUROLOGY

## 2018-03-17 PROCEDURE — 25000128 H RX IP 250 OP 636: Performed by: HOSPITALIST

## 2018-03-17 PROCEDURE — 12400001 ZZH R&B MH UMMC

## 2018-03-17 PROCEDURE — 80053 COMPREHEN METABOLIC PANEL: CPT | Performed by: HOSPITALIST

## 2018-03-17 PROCEDURE — 85027 COMPLETE CBC AUTOMATED: CPT | Performed by: HOSPITALIST

## 2018-03-17 RX ORDER — SODIUM CHLORIDE 9 MG/ML
INJECTION, SOLUTION INTRAVENOUS CONTINUOUS
Status: DISCONTINUED | OUTPATIENT
Start: 2018-03-17 | End: 2018-03-17

## 2018-03-17 RX ADMIN — MULTIPLE VITAMINS W/ MINERALS TAB 1 TABLET: TAB at 09:16

## 2018-03-17 RX ADMIN — FOLIC ACID 1 MG: 1 TABLET ORAL at 09:16

## 2018-03-17 RX ADMIN — Medication 100 MG: at 09:16

## 2018-03-17 RX ADMIN — LORAZEPAM 2 MG: 2 INJECTION INTRAMUSCULAR; INTRAVENOUS at 00:54

## 2018-03-17 RX ADMIN — SODIUM CHLORIDE 500 ML: 9 INJECTION, SOLUTION INTRAVENOUS at 02:50

## 2018-03-17 RX ADMIN — ONDANSETRON 4 MG: 4 TABLET, ORALLY DISINTEGRATING ORAL at 19:15

## 2018-03-17 RX ADMIN — DULOXETINE HYDROCHLORIDE 60 MG: 60 CAPSULE, DELAYED RELEASE ORAL at 09:16

## 2018-03-17 RX ADMIN — NALTREXONE HYDROCHLORIDE 50 MG: 50 TABLET, FILM COATED ORAL at 09:16

## 2018-03-17 RX ADMIN — ONDANSETRON 4 MG: 4 TABLET, ORALLY DISINTEGRATING ORAL at 09:15

## 2018-03-17 RX ADMIN — METRONIDAZOLE 500 MG: 500 TABLET ORAL at 09:16

## 2018-03-17 RX ADMIN — OLANZAPINE 10 MG: 10 INJECTION, POWDER, LYOPHILIZED, FOR SOLUTION INTRAMUSCULAR at 21:05

## 2018-03-17 RX ADMIN — DIAZEPAM 5 MG: 5 TABLET ORAL at 09:17

## 2018-03-17 RX ADMIN — BUSPIRONE HYDROCHLORIDE 15 MG: 15 TABLET ORAL at 09:15

## 2018-03-17 ASSESSMENT — ACTIVITIES OF DAILY LIVING (ADL)
HYGIENE/GROOMING: INDEPENDENT
DRESS: INDEPENDENT
ORAL_HYGIENE: INDEPENDENT
LAUNDRY: WITH SUPERVISION

## 2018-03-17 NOTE — PROGRESS NOTES
IVF completed and Ativan IV done near start of night shift.  MSSA at 0400 was 6.  Patient continues with being irritable and states continuous aches and pains.  Mild nausea continues.  Patient declines Tylenol, ibuprofen, Zofran or any other prns at this time.  States that noone has been helping her on evening or night shift.  Has slept 2 hours so far this night shift.  Will continue to monitor patient.

## 2018-03-17 NOTE — PROGRESS NOTES
"Pt complaining of nausea and not eating anything. Pt requesting for an IV fluids because she thinks she is getting dehydrated. Pt offered ODT Zofran for nausea but pt refused stating \"have tried it and doesn't work for me\". Pt states she wants the zofran through the IV and that is the only way it would work for her. Writer explained to pt that the pills dissolves in mouth and she will not need to swallow but pt refused. Pt offered ginger-vanita soda but refused. Pt getting agitated and states if she doesn't get the IV \"I am going to flip\". No signs of dehydration noted. VSS. Will update the on-call MD.  "

## 2018-03-17 NOTE — PROGRESS NOTES
Lorena is a 37 years old  female. She was admitted to [facility] for suicidal ideation with depression, and alcohol withdrawal per pt. She has report being nauseated and vomiting since arrival. Despite trying oral medications, she insisted that  nothing works .  I need IV . Early morning, she was given IV fluid infusion with medications. She want more IV fluid this afternoon because she insists she is dehydrated. Her vital signs are stable. Good skin turgor. Lips moist. She has no distress. Her gaits are steady. She has no substance withdrawal signs or symptoms. She insists that she continues to have nausea and vomiting. Staff has not observed any vomitus. She is offered ODT Zofran. Pt states  it never works .  You are not helping me   no body care   That is not what I want . This writer asks pt  what would you think it would work for you. She replies that  I want IV . This writer offers her ginger ales and saline cracker. Pt states  they don t work. I had it before . Pt is not receptive to any remedies. Pt is out in the hallway using the phone saying  they do not help me . In fact two nurses had spent at least 50 minutes time with her and with physician to address her concerns and requests (not including mariana associate s time). At this time, she requests to talk to nursing supervisor. Nursing supervisor is paged.   After talking to nursing supervisor, she agrees to try saline cracker and ginger ales.She reports that she continues to feel nauseated and anxious. She declines to take oral medications. She prefers injection. This writer asks her if she would like to try Zyprexa. Benefit and harm is discussed with her. She agrees to take the IM Zyprexa. IM Zyprexa is given to her. June Triplett associate is with this wroter during encounter. Recheck her after 30 minutes. She reports that her nausea and anxiety have improved. Recheck at 11 pm, she is sleeping soundly. No distress. Symptoms temporary resolved.

## 2018-03-17 NOTE — PROGRESS NOTES
03/17/18 1511   Behavioral Health   Hallucinations denies / not responding to hallucinations   Thinking intact   Orientation place: oriented;date: oriented;person: oriented;time: oriented   Memory baseline memory   Insight poor   Judgement impaired   Eye Contact cultural specific   Affect full range affect   Mood mood is calm   Physical Appearance/Attire neat   Hygiene well groomed   Suicidality other (see comments)  (Denied)   1. Wish to be Dead No   2. Non-Specific Active Suicidal Thoughts  No   Self Injury other (see comment)  (Passive thoughts)   Elopement (None Observed)   Activity isolative   Speech coherent;clear   Medication Sensitivity no observed side effects   Psychomotor / Gait steady;balanced   Psycho Education   Type of Intervention 1:1 intervention   Response participates, initiates socially appropriate   Hours 0.5   Activities of Daily Living   Hygiene/Grooming independent   Oral Hygiene independent   Dress independent   Laundry with supervision   Room Organization independent     Pt was isolative in her for the majority of the shift. Pt slept until 1300. Pt was visited by her father during visiting hours. Pt stated that she feels very nauseous and throws up everything she eats and drinks. Pt stated that she throw up 3 times today and feels very dehydrated. Pt did not eat anything for breakfast and only ate peaches for lunch. Pt reported having passive SIB thoughts, but stated that she had these thoughts to distract her from withdraws.

## 2018-03-17 NOTE — PROGRESS NOTES
On-call MD returned page and after discussing about pt condition, doctor instructed to continue to monitor and call back when pt is noted having signs of nausea or vomits and to re-approach pt and offer the ODT zofran. Will continue to monitor.

## 2018-03-17 NOTE — PROGRESS NOTES
PT c/o nausea, was given am meds with zofran 4mg,this am and was resting in bed most of morning. PT came up at 1500 to ask for IVs. Offered pt zofran 8 mg she refused, she is adamant that she wants IV fluids. Medical flyer paged. Pt labs WNL.  Pulse 71, no diaphoresis present, no tremor when drinking water and handling med cup.

## 2018-03-18 PROCEDURE — 12400001 ZZH R&B MH UMMC

## 2018-03-18 PROCEDURE — 25000125 ZZHC RX 250: Performed by: PSYCHIATRY & NEUROLOGY

## 2018-03-18 RX ORDER — OLANZAPINE 5 MG/1
5 TABLET, ORALLY DISINTEGRATING ORAL ONCE
Status: DISCONTINUED | OUTPATIENT
Start: 2018-03-18 | End: 2018-03-22 | Stop reason: HOSPADM

## 2018-03-18 RX ADMIN — OLANZAPINE 10 MG: 10 INJECTION, POWDER, LYOPHILIZED, FOR SOLUTION INTRAMUSCULAR at 22:16

## 2018-03-18 ASSESSMENT — ACTIVITIES OF DAILY LIVING (ADL)
DRESS: INDEPENDENT
ORAL_HYGIENE: INDEPENDENT
LAUNDRY: WITH SUPERVISION
ORAL_HYGIENE: INDEPENDENT
GROOMING: INDEPENDENT
GROOMING: INDEPENDENT
DRESS: INDEPENDENT
LAUNDRY: WITH SUPERVISION

## 2018-03-18 NOTE — PLAN OF CARE
Problem: Mood Impairment (Depressive Signs/Symptoms) (Adult)  Goal: Improved Mood Symptoms (Depressive Signs/Symptoms)  Outcome: No Change  Pt denied SI/SIB. Pt very irritable and not very brief when writer attempted checking in with her. Pt states she feels that this writer doesn't care about her and requesting the the other staff to help her. Pt was helped by another RN staff for the rest of the shift.   Pt isolative in her room the whole shift.

## 2018-03-18 NOTE — PROGRESS NOTES
Pt had small liquid emesis of apple juice, offered zofran SL and she refused. Medical provider notified see medicine  note. Pt VSS. Pt appears to be sleeping in bed during 15 min checks.

## 2018-03-18 NOTE — PLAN OF CARE
"Brief Medicine Note  March 18, 2018      Medicine was contacted in regards to patient having intermittent nausea with episode of emesis today. She had an IV yesterday with 500cc bolus per moonlighter for complaints of nausea and had IV zyprexa. She has been going through withdrawal, but now this is improving. She is requesting IV ativan or any IV meds to help her nausea today. She is refusing to take ODT zofran. Could consider compazine IM, but she has an allergy to this. Her vital signs are stable and bedside RN reports she is up and walking around unit \"angry\" and in no acute distress. She is not complaining of abdominal pain. Agree with continuing prn ODT zofran at this time, would not recommend IVs unless her vital signs worsen.     Nova Atkinson PA-C        "

## 2018-03-18 NOTE — PROGRESS NOTES
"Patient spent the entire shift withdrawn, isolative and napping in room. Refused breakfast and lunch. Drank a cane of apple juice only. Each time staff approached this patient and invites or encourages her to eat or drink something, patient responded \"I am not hungry and nothing is staying in my stomach\" but continuous to complained of colon pain and headaches. Reports feeling nauseated and thinks her body is shutting down slowly. Patient reports vomiting twice on this shift. Denied SI/SIB.          03/18/18 4770   Behavioral Health   Hallucinations denies / not responding to hallucinations   Thinking intact   Orientation person: oriented;place: oriented;date: oriented;time: oriented   Memory baseline memory   Insight poor   Judgement impaired   Eye Contact at examiner   Affect blunted, flat   Mood anxious;irritable   Physical Appearance/Attire appears stated age   Hygiene neglected grooming - unclean body, hair, teeth   Suicidality other (see comments)  (denies)   1. Wish to be Dead No   2. Non-Specific Active Suicidal Thoughts  No   Self Injury other (see comment)  (denies)   Elopement (none observed )   Activity isolative;withdrawn   Speech clear;coherent   Medication Sensitivity no stated side effects   Psychomotor / Gait balanced;steady   Safety   Suicidality Status 15   Psycho Education   Type of Intervention 1:1 intervention   Response participates, initiates socially appropriate   Hours 0.5   Treatment Detail (check in)   Activities of Daily Living   Hygiene/Grooming independent   Oral Hygiene independent   Dress independent   Laundry with supervision   Room Organization independent   Groups   Details (isolative )     "

## 2018-03-19 ENCOUNTER — APPOINTMENT (OUTPATIENT)
Dept: GENERAL RADIOLOGY | Facility: CLINIC | Age: 38
DRG: 885 | End: 2018-03-19
Attending: PHYSICIAN ASSISTANT
Payer: COMMERCIAL

## 2018-03-19 LAB
ALBUMIN SERPL-MCNC: 4.5 G/DL (ref 3.4–5)
ALBUMIN UR-MCNC: 30 MG/DL
ALP SERPL-CCNC: 71 U/L (ref 40–150)
ALT SERPL W P-5'-P-CCNC: 22 U/L (ref 0–50)
ANION GAP SERPL CALCULATED.3IONS-SCNC: 12 MMOL/L (ref 3–14)
APPEARANCE UR: CLEAR
AST SERPL W P-5'-P-CCNC: 23 U/L (ref 0–45)
BACTERIA #/AREA URNS HPF: ABNORMAL /HPF
BILIRUB SERPL-MCNC: 0.5 MG/DL (ref 0.2–1.3)
BILIRUB UR QL STRIP: NEGATIVE
BUN SERPL-MCNC: 12 MG/DL (ref 7–30)
CALCIUM SERPL-MCNC: 8.8 MG/DL (ref 8.5–10.1)
CHLORIDE SERPL-SCNC: 105 MMOL/L (ref 94–109)
CO2 SERPL-SCNC: 18 MMOL/L (ref 20–32)
COLOR UR AUTO: YELLOW
CREAT SERPL-MCNC: 0.8 MG/DL (ref 0.52–1.04)
ERYTHROCYTE [DISTWIDTH] IN BLOOD BY AUTOMATED COUNT: 12.6 % (ref 10–15)
GFR SERPL CREATININE-BSD FRML MDRD: 80 ML/MIN/1.7M2
GLUCOSE SERPL-MCNC: 61 MG/DL (ref 70–99)
GLUCOSE UR STRIP-MCNC: NEGATIVE MG/DL
HCT VFR BLD AUTO: 48.3 % (ref 35–47)
HGB BLD-MCNC: 16.2 G/DL (ref 11.7–15.7)
HGB UR QL STRIP: NEGATIVE
KETONES UR STRIP-MCNC: >150 MG/DL
LEUKOCYTE ESTERASE UR QL STRIP: NEGATIVE
LIPASE SERPL-CCNC: 69 U/L (ref 73–393)
MCH RBC QN AUTO: 32.3 PG (ref 26.5–33)
MCHC RBC AUTO-ENTMCNC: 33.5 G/DL (ref 31.5–36.5)
MCV RBC AUTO: 96 FL (ref 78–100)
MUCOUS THREADS #/AREA URNS LPF: PRESENT /LPF
NITRATE UR QL: NEGATIVE
PH UR STRIP: 5.5 PH (ref 5–7)
PLATELET # BLD AUTO: 275 10E9/L (ref 150–450)
POTASSIUM SERPL-SCNC: 4.8 MMOL/L (ref 3.4–5.3)
PROT SERPL-MCNC: 8.6 G/DL (ref 6.8–8.8)
RBC # BLD AUTO: 5.01 10E12/L (ref 3.8–5.2)
RBC #/AREA URNS AUTO: <1 /HPF (ref 0–2)
SODIUM SERPL-SCNC: 135 MMOL/L (ref 133–144)
SOURCE: ABNORMAL
SP GR UR STRIP: 1.02 (ref 1–1.03)
SQUAMOUS #/AREA URNS AUTO: 5 /HPF (ref 0–1)
UROBILINOGEN UR STRIP-MCNC: NORMAL MG/DL (ref 0–2)
WBC # BLD AUTO: 6.6 10E9/L (ref 4–11)
WBC #/AREA URNS AUTO: 3 /HPF (ref 0–5)

## 2018-03-19 PROCEDURE — 36415 COLL VENOUS BLD VENIPUNCTURE: CPT | Performed by: PHYSICIAN ASSISTANT

## 2018-03-19 PROCEDURE — 25000128 H RX IP 250 OP 636: Performed by: PHYSICIAN ASSISTANT

## 2018-03-19 PROCEDURE — 74019 RADEX ABDOMEN 2 VIEWS: CPT

## 2018-03-19 PROCEDURE — 99232 SBSQ HOSP IP/OBS MODERATE 35: CPT | Performed by: PSYCHIATRY & NEUROLOGY

## 2018-03-19 PROCEDURE — 99232 SBSQ HOSP IP/OBS MODERATE 35: CPT | Performed by: PHYSICIAN ASSISTANT

## 2018-03-19 PROCEDURE — 83690 ASSAY OF LIPASE: CPT | Performed by: PHYSICIAN ASSISTANT

## 2018-03-19 PROCEDURE — 12400001 ZZH R&B MH UMMC

## 2018-03-19 PROCEDURE — 85027 COMPLETE CBC AUTOMATED: CPT | Performed by: PHYSICIAN ASSISTANT

## 2018-03-19 PROCEDURE — 25000125 ZZHC RX 250: Performed by: PSYCHIATRY & NEUROLOGY

## 2018-03-19 PROCEDURE — 25000132 ZZH RX MED GY IP 250 OP 250 PS 637: Performed by: PSYCHIATRY & NEUROLOGY

## 2018-03-19 PROCEDURE — 81001 URINALYSIS AUTO W/SCOPE: CPT | Performed by: PHYSICIAN ASSISTANT

## 2018-03-19 PROCEDURE — 80053 COMPREHEN METABOLIC PANEL: CPT | Performed by: PHYSICIAN ASSISTANT

## 2018-03-19 PROCEDURE — 99207 ZZC CDG-MDM COMPONENT: MEETS LOW - DOWN CODED: CPT | Performed by: PHYSICIAN ASSISTANT

## 2018-03-19 PROCEDURE — 25000132 ZZH RX MED GY IP 250 OP 250 PS 637: Performed by: PHYSICIAN ASSISTANT

## 2018-03-19 RX ORDER — BISACODYL 10 MG
10 SUPPOSITORY, RECTAL RECTAL DAILY
Status: DISCONTINUED | OUTPATIENT
Start: 2018-03-20 | End: 2018-03-22 | Stop reason: HOSPADM

## 2018-03-19 RX ORDER — POLYETHYLENE GLYCOL 3350 17 G/17G
17 POWDER, FOR SOLUTION ORAL 2 TIMES DAILY
Status: DISCONTINUED | OUTPATIENT
Start: 2018-03-19 | End: 2018-03-22 | Stop reason: HOSPADM

## 2018-03-19 RX ADMIN — BUSPIRONE HYDROCHLORIDE 15 MG: 15 TABLET ORAL at 20:07

## 2018-03-19 RX ADMIN — POLYETHYLENE GLYCOL 3350 17 G: 17 POWDER, FOR SOLUTION ORAL at 20:07

## 2018-03-19 RX ADMIN — METRONIDAZOLE 500 MG: 500 TABLET ORAL at 20:07

## 2018-03-19 RX ADMIN — OLANZAPINE 10 MG: 10 TABLET, FILM COATED ORAL at 20:07

## 2018-03-19 RX ADMIN — BISACODYL 10 MG: 10 SUPPOSITORY RECTAL at 09:03

## 2018-03-19 RX ADMIN — SODIUM CHLORIDE, POTASSIUM CHLORIDE, SODIUM LACTATE AND CALCIUM CHLORIDE 1000 ML: 600; 310; 30; 20 INJECTION, SOLUTION INTRAVENOUS at 18:31

## 2018-03-19 RX ADMIN — TRAZODONE HYDROCHLORIDE 100 MG: 50 TABLET ORAL at 21:38

## 2018-03-19 ASSESSMENT — ACTIVITIES OF DAILY LIVING (ADL)
DRESS: STREET CLOTHES
DRESS: SCRUBS (BEHAVIORAL HEALTH)
GROOMING: INDEPENDENT
ORAL_HYGIENE: INDEPENDENT
ORAL_HYGIENE: INDEPENDENT
GROOMING: HANDWASHING

## 2018-03-19 NOTE — CONSULTS
"  Internal Medicine Initial Visit    Lorena Holden MRN# [id number]   Age: 37 year old YOB: 1980   Date of Admission: 3/15/2018     Reason for consult: Nausea, Constipation, Poor Oral Intake       Requesting physician Dr. Moore      SUBJECTIVE   HPI:   Lorena Holden is a 37 year old female with a history of endometriosis, chronic pelvic pain, alcohol abuse, depression, anxiety, and PTSD who was admitted to inpatient behavioral health on 3/15/18 with suicidal ideation. Internal Medicine was initially consulted on 3/16/18 for evaluation of endometriosis and alcohol withdrawal; please refer to the note by Ervin PRETTY for further details. Internal Medicine was reconsulted today for evaluation of nausea, vomiting, constipation, and poor oral intake. Ms. Holden reports poor oral intake, nausea, and vomiting over the past 6 days in the context of alcohol withdrawal. She reports vomiting with any oral intake including liquids; emesis has been non-bloody. Symptoms have been associated with constipation and bilateral abdominal pain. She had several small \"rabbit robert\" today after receiving a suppository, but has not had a \"normal\" BM for 5 days. She has intermittent issues with constipation in the outpatient setting and reports GoLytely is the only medication which has worked for her in the past. Aside from her GI symptoms, she endorses pain with urination, bilateral lower back pain, hot/cold flashes, chills, headaches, sore throat from frequent emesis, 1 month history of cough, and SOB with panic attacks.    OBJECTIVE   Physical Exam:   Blood pressure 126/87, pulse 97, temperature 98.4  F (36.9  C), temperature source Oral, resp. rate 16, last menstrual period 03/10/2018, SpO2 98 %.  General: Awake. Appears uncomfortable, but non-toxic. NAD.  HEENT: NC/AT. Anicteric sclera. Eyes symmetric and free of discharge bilaterally. Mucous membranes moist.  Cardiovascular: RRR. S1,S2. No murmurs " appreciated.  Lungs: Normal respiratory effort on RA. Lungs CTAB without wheezes, rales, or rhonchi.  Abdomen: Soft and nondistended with bowel sounds present. Tender to palpation of lower abdomen. No peritoneal signs.  Extremities: Warm & well perfused. No peripheral edema.  Skin: No rashes, jaundice, or lesions on exposed areas of skin.  Neurologic: A&O X 3. Answers questions appropriately. Moves all extremities symmetrically.     Laboratory Data:     CMP  Recent Labs  Lab 03/19/18  1520 03/17/18  0004 03/16/18  0733    138 139   POTASSIUM 4.8 3.6 4.0   CHLORIDE 105 102 104   CO2 18* 28 27   ANIONGAP 12 8 8   GLC 61* 79 76   BUN 12 12 11   CR 0.80 0.77 0.73   GFRESTIMATED 80 84 89   GFRESTBLACK >90 >90 >90   DIONNA 8.8 8.3* 8.2*   PROTTOTAL 8.6 7.2 7.1   ALBUMIN 4.5 3.8 3.7   BILITOTAL 0.5 0.5 0.7   ALKPHOS 71 61 59   AST 23 18 17   ALT 22 18 18       CBC  Recent Labs  Lab 03/19/18  1520 03/17/18  0004 03/16/18  0733   WBC 6.6 5.6 4.4   RBC 5.01 4.39 4.37   HGB 16.2* 14.0 13.9   HCT 48.3* 41.5 42.1   MCV 96 95 96   MCH 32.3 31.9 31.8   MCHC 33.5 33.7 33.0   RDW 12.6 12.3 12.7    204 200       TSH  Recent Labs  Lab 03/16/18  0733   TSH 1.92        Assessment and Plan/Recommendations:   Lorena Holden is a 37 year old female with a history of endometriosis, chronic pelvic pain, alcohol abuse, depression, anxiety, and PTSD who was admitted to inpatient behavioral health on 3/15/18 with suicidal ideation. Internal Medicine was consulted for nausea, vomiting, abdominal pain, poor PO intake, and constipation.    Nausea with Vomiting, Lower Abdominal Pain, Constipation - Symptoms present for 5-6 days. Nausea/vomiting likely related to alcohol withdrawal and constipation. Abdominal pain likely 2/2 constipation. Constipation likely due to poor PO intake, medications (Zyprexa, Valium), and hx of multiple prior abdominal surgeries. CMP and Lipase unrevealing. CBC with elevated Hgb/Hct consistent with dehydration,  otherwise WNL.  - Give 1000 mL bolus of LR  - Continue Zofran ODT 4-8 mg q 4h PRN for n/v  - AXR to assess stool burden  - Daily Dulcolax suppository until constipation resolves  - Start Miralax 17 g BID, though doubt patient will be compliant as she is refusing most PO medications.  - Consider GoLytely pending results of AXR as this has previously been effective for patient.  - Other medication considerations pending AXR results include Mag Citrate, Sorbitol, Lactulose, and enemas  - Notify Medicine if hemodynamic instability, fever >101, or new/worsening symptoms    Dysuria - Obtain UA/UC. Defer antibiotics for now given afebrile w/o leukocytosis. Low suspicion for pyelonephritis given lack of infectious s/s and back pain is located lower than CVA & bilateral in nature.    NAGMA - Bicarb 18 on 3/19/18, previously WNL on 3/16 and 3/17. Normal AG. No obvious etiology, ? if due to lab error. Trend BMP 3/20.    Medicine will follow up on results of AXR, UA, and BMP.     Vane Fitch PA-C  Hospitalist Service  [phone]

## 2018-03-19 NOTE — PROGRESS NOTES
03/19/18 1505   Behavioral Health   Hallucinations denies / not responding to hallucinations   Thinking poor concentration   Orientation time: oriented;date: oriented;place: oriented;person: oriented   Memory baseline memory   Insight poor   Judgement impaired   Eye Contact at floor   Affect blunted, flat   Mood anxious   Physical Appearance/Attire attire appropriate to age and situation   Hygiene neglected grooming - unclean body, hair, teeth   Suicidality other (see comments)  (none observed )   1. Wish to be Dead No   2. Non-Specific Active Suicidal Thoughts  No   Self Injury other (see comment)  (none observed )   Elopement (none observed )   Activity isolative;withdrawn;refusal;restless   Speech coherent;clear   Medication Sensitivity no observed side effects;no stated side effects   Psychomotor / Gait balanced   Psycho Education   Type of Intervention 1:1 intervention   Response refuses   Hours 0.5   Treatment Detail check in    Activities of Daily Living   Hygiene/Grooming handwashing   Oral Hygiene independent   Dress street clothes   Room Organization independent   Activity   Activity Assistance Provided independent     Pt was isolative in her room and refused to come out. Pt also refused to attend any of the groups. Staff did not observed any SI/SIB this morning.

## 2018-03-19 NOTE — PROGRESS NOTES
"   03/18/18 2200   Behavioral Health   Hallucinations denies / not responding to hallucinations   Thinking poor concentration   Orientation person: oriented;place: oriented   Memory baseline memory   Insight poor   Judgement impaired   Eye Contact at examiner   Affect full range affect   Mood mood is calm   Physical Appearance/Attire neat   Hygiene well groomed   Suicidality other (see comments)  (\" not really\")   1. Wish to be Dead (\"not really it comes and go\")   Self Injury (denies )   Elopement (none reported or observed )   Activity isolative;withdrawn   Speech clear;coherent   Activities of Daily Living   Hygiene/Grooming independent   Oral Hygiene independent   Dress independent   Laundry with supervision   Room Organization independent     When this writer asked pt if she had any SI thoughts pt reply \" not really it comes and go.\"  Pt denied SIB.  Pt reported feeling depressed (9).  Pt is seems calm, blunted affect, isolative, withdrawn and spend most of the shift in bed.  Pt reported she did not eat and she does not want to eat anything.\"    "

## 2018-03-19 NOTE — PROGRESS NOTES
"Lorena complained of not having a BM for 5 days.  She asked for something for this and was given a Dulcolax suppository.  She has not eaten in a number of days.  However, she says her stomach hurts and she needs to have a BM.  She said she only had \"a few robert\".  This RN will notify Dr. Moore.  "

## 2018-03-19 NOTE — PROGRESS NOTES
MOONLIGHT NOTE    Called about pt's abd xray. It shows a nonobstructive gas pattern and stool burden. RN will give her PRN miralax and milk of mag. P had a suppository awhile ago. Will give these interventions a chance to work.       Jacqui Bello MD

## 2018-03-19 NOTE — PROGRESS NOTES
"[facility], [place]   Psychiatric Progress Note        Interim History:   The patient's care was discussed with the treatment team during the daily team meeting and/or staff's chart notes were reviewed.  Staff report patient spent most of weekend in her room. She has been refusing to eat and complaining of nausea and vomiting and constipation. Said that she had nausea and vomiting in the morning for a long time, but: \"not as it is now\". Reported feeling depressed, highly anxious and irritable. Said she was upset with many staff members about not meeting her needs. Said she still wanted to go to [facility] which was holding bed for her, but doubted she could do it now. Was seen by IM, see IM's note below.     Nausea with Vomiting, Lower Abdominal Pain, Constipation - Symptoms present for 5-6 days. Nausea/vomiting likely related to alcohol withdrawal and constipation. Abdominal pain likely 2/2 constipation. Constipation likely due to poor PO intake, medications (Zyprexa, Valium), and hx of multiple prior abdominal surgeries. CMP and Lipase unrevealing. CBC with elevated Hgb/Hct consistent with dehydration, otherwise WNL.  - Give 1000 mL bolus of LR  - Continue Zofran ODT 4-8 mg q 4h PRN for n/v  - AXR to assess stool burden  - Daily Dulcolax suppository until constipation resolves  - Start Miralax 17 g BID, though doubt patient will be compliant as she is refusing most PO medications.  - Consider GoLytely pending results of AXR as this has previously been effective for patient.  - Other medication considerations pending AXR results include Mag Citrate, Sorbitol, Lactulose, and enemas  - Notify Medicine if hemodynamic instability, fever >101, or new/worsening symptoms     Dysuria - Obtain UA/UC. Defer antibiotics for now given afebrile w/o leukocytosis. Low suspicion for pyelonephritis given lack of infectious s/s and back pain is located lower than CVA & bilateral in nature.     NAGMA " - Bicarb 18 on 3/19/18, previously WNL on 3/16 and 3/17. Normal AG. No obvious etiology, ? if due to lab error. Trend BMP 3/20.       Medications:       [START ON 3/20/2018] bisacodyl  10 mg Rectal Daily     polyethylene glycol  17 g Oral BID     lactated ringers  1,000 mL Intravenous Once     OLANZapine zydis  5 mg Oral Once     busPIRone  15 mg Oral TID     DULoxetine  60 mg Oral Daily     fluticasone  2 spray Both Nostrils Daily     metroNIDAZOLE  500 mg Oral BID     naltrexone  50 mg Oral Daily     folic acid  1 mg Oral Daily     multivitamin, therapeutic with minerals  1 tablet Oral Daily          Allergies:     Allergies   Allergen Reactions     Bee Venom Anaphylaxis     Compazine [Prochlorperazine] Anaphylaxis     Edisylate/Maleate          Labs:     Recent Results (from the past 24 hour(s))   Comprehensive metabolic panel    Collection Time: 03/19/18  3:20 PM   Result Value Ref Range    Sodium 135 133 - 144 mmol/L    Potassium 4.8 3.4 - 5.3 mmol/L    Chloride 105 94 - 109 mmol/L    Carbon Dioxide 18 (L) 20 - 32 mmol/L    Anion Gap 12 3 - 14 mmol/L    Glucose 61 (L) 70 - 99 mg/dL    Urea Nitrogen 12 7 - 30 mg/dL    Creatinine 0.80 0.52 - 1.04 mg/dL    GFR Estimate 80 >60 mL/min/1.7m2    GFR Estimate If Black >90 >60 mL/min/1.7m2    Calcium 8.8 8.5 - 10.1 mg/dL    Bilirubin Total 0.5 0.2 - 1.3 mg/dL    Albumin 4.5 3.4 - 5.0 g/dL    Protein Total 8.6 6.8 - 8.8 g/dL    Alkaline Phosphatase 71 40 - 150 U/L    ALT 22 0 - 50 U/L    AST 23 0 - 45 U/L   CBC with platelets    Collection Time: 03/19/18  3:20 PM   Result Value Ref Range    WBC 6.6 4.0 - 11.0 10e9/L    RBC Count 5.01 3.8 - 5.2 10e12/L    Hemoglobin 16.2 (H) 11.7 - 15.7 g/dL    Hematocrit 48.3 (H) 35.0 - 47.0 %    MCV 96 78 - 100 fl    MCH 32.3 26.5 - 33.0 pg    MCHC 33.5 31.5 - 36.5 g/dL    RDW 12.6 10.0 - 15.0 %    Platelet Count 275 150 - 450 10e9/L   Lipase    Collection Time: 03/19/18  3:20 PM   Result Value Ref Range    Lipase 69 (L) 73 - 393 U/L           Psychiatric Examination:     BP (!) 139/95  Pulse 90  Temp 98.5  F (36.9  C) (Oral)  Resp 16  LMP 03/10/2018 (Exact Date)  SpO2 98%  Weight is 0 lbs 0 oz  There is no height or weight on file to calculate BMI.  Orthostatic Vitals       Most Recent      Lying Orthostatic /81 03/18 1619    Lying Orthostatic Pulse (bpm) 87 03/18 1619            Appearance: awake, alert and dressed in hospital scrubs  Attitude:  somewhat cooperative  Eye Contact:  fair  Mood:  angry and anxious  Affect:  appropriate and in normal range  Speech:  clear, coherent  Psychomotor Behavior:  no evidence of tardive dyskinesia, dystonia, or tics and intact station, gait and muscle tone  Throught Process:  logical and linear  Associations:  no loose associations  Thought Content:  passive suicidal ideation present  Insight:  partial  Judgement:  fair  Oriented to:  time, person, and place  Attention Span and Concentration:  intact  Recent and Remote Memory:  fair    Clinical Global Impressions  First:  Considering your total clinical experience with this particular patient population, how severe are the patient's symptoms at this time?: 7 (03/16/18 1451)  Compared to the patient's condition at the START of treatment, this patient's condition is:: 4 (03/16/18 1451)  Most recent:  Considering your total clinical experience with this particular patient population, how severe are the patient's symptoms at this time?: 7 (03/16/18 1451)  Compared to the patient's condition at the START of treatment, this patient's condition is:: 4 (03/16/18 1451)    # Pain Assessment:   Current Pain Score 3/17/2018 7/16/2017 7/16/2017   Patient currently in pain? no denies sleeping: patient not able to self report   Pain score (0-10) - - -   Pain location - - -   Pain descriptors - - -   - Lorena is experiencing pain due to constipation.. Pain management was discussed and the plan was created in a collaborative fashion.  Lorena's response to the  current recommendations: was seen by IM, see discussion above.  - Pharmacologic adjuvants: NSAIDs and Acetaminophen               Precautions:     Behavioral Orders   Procedures     Code 2     Routine Programming     As clinically indicated     Status 15     Every 15 minutes.     Suicide precautions     Withdrawal precautions          DIagnoses:     1.  Major depressive disorder, recurrent, moderate severity.   2.  Posttraumatic stress disorder.   3.  Alcohol use disorder, moderate severity.     4.  Possible borderline personality disorder.          Plan:     Patient was seen by Internal Medicine. Patient's emotional lability seems to correlate closely with her physical discomfort. No medication changes today. Will coordinate care with IM. Patient has a bed waiting for her at [facility].

## 2018-03-19 NOTE — PROGRESS NOTES
"Pt complained of nausea. Offered ODT Zofran but pt refused stating \"it doesn't work\". Ginger-vanita was offered and pt refused. On-call provider updated and ordered ODT Zyprexa. Pt was offered the ODT Zyprexa but pt refused. Pt states \"I want IV\". Pt complained of headache. Pt refused Tylenol and Ice pack was provided. Pt complained felling anxious and agitated. IM Zyprexa was administered and was moderately effective. Pt was instructed to discuss with her primary doctor on other alternatives for her nausea. Will continue to monitor.  "

## 2018-03-20 LAB
ANION GAP SERPL CALCULATED.3IONS-SCNC: 14 MMOL/L (ref 3–14)
BUN SERPL-MCNC: 8 MG/DL (ref 7–30)
CALCIUM SERPL-MCNC: 8.8 MG/DL (ref 8.5–10.1)
CHLORIDE SERPL-SCNC: 105 MMOL/L (ref 94–109)
CO2 SERPL-SCNC: 15 MMOL/L (ref 20–32)
CREAT SERPL-MCNC: 0.75 MG/DL (ref 0.52–1.04)
GFR SERPL CREATININE-BSD FRML MDRD: 86 ML/MIN/1.7M2
GLUCOSE SERPL-MCNC: 67 MG/DL (ref 70–99)
POTASSIUM SERPL-SCNC: 4.2 MMOL/L (ref 3.4–5.3)
SODIUM SERPL-SCNC: 134 MMOL/L (ref 133–144)

## 2018-03-20 PROCEDURE — 80048 BASIC METABOLIC PNL TOTAL CA: CPT | Performed by: PHYSICIAN ASSISTANT

## 2018-03-20 PROCEDURE — 25000132 ZZH RX MED GY IP 250 OP 250 PS 637: Performed by: PSYCHIATRY & NEUROLOGY

## 2018-03-20 PROCEDURE — 12400007 ZZH R&B MH INTERMEDIATE UMMC

## 2018-03-20 PROCEDURE — 25000125 ZZHC RX 250: Performed by: PSYCHIATRY & NEUROLOGY

## 2018-03-20 PROCEDURE — 25000132 ZZH RX MED GY IP 250 OP 250 PS 637: Performed by: PHYSICIAN ASSISTANT

## 2018-03-20 PROCEDURE — 36415 COLL VENOUS BLD VENIPUNCTURE: CPT | Performed by: PHYSICIAN ASSISTANT

## 2018-03-20 RX ORDER — FAMOTIDINE 10 MG
10 TABLET ORAL 2 TIMES DAILY
Status: DISCONTINUED | OUTPATIENT
Start: 2018-03-20 | End: 2018-03-22 | Stop reason: HOSPADM

## 2018-03-20 RX ORDER — SORBITOL SOLUTION 70 %
30 SOLUTION, ORAL MISCELLANEOUS DAILY
Status: DISCONTINUED | OUTPATIENT
Start: 2018-03-20 | End: 2018-03-22 | Stop reason: HOSPADM

## 2018-03-20 RX ADMIN — ONDANSETRON 8 MG: 4 TABLET, ORALLY DISINTEGRATING ORAL at 15:48

## 2018-03-20 RX ADMIN — BUSPIRONE HYDROCHLORIDE 15 MG: 15 TABLET ORAL at 20:15

## 2018-03-20 RX ADMIN — POLYETHYLENE GLYCOL 3350 17 G: 17 POWDER, FOR SOLUTION ORAL at 20:15

## 2018-03-20 RX ADMIN — FAMOTIDINE 10 MG: 10 TABLET ORAL at 20:15

## 2018-03-20 RX ADMIN — SORBITOL SOLUTION (BULK) 30 ML: 70 SOLUTION at 19:33

## 2018-03-20 RX ADMIN — TRAZODONE HYDROCHLORIDE 50 MG: 50 TABLET ORAL at 20:15

## 2018-03-20 RX ADMIN — ONDANSETRON 8 MG: 4 TABLET, ORALLY DISINTEGRATING ORAL at 20:18

## 2018-03-20 RX ADMIN — METRONIDAZOLE 500 MG: 500 TABLET ORAL at 20:15

## 2018-03-20 ASSESSMENT — ACTIVITIES OF DAILY LIVING (ADL)
ORAL_HYGIENE: INDEPENDENT
LAUNDRY: WITH SUPERVISION
GROOMING: INDEPENDENT
HYGIENE/GROOMING: INDEPENDENT
LAUNDRY: UNABLE TO COMPLETE
DRESS: INDEPENDENT
DRESS: SCRUBS (BEHAVIORAL HEALTH);INDEPENDENT
ORAL_HYGIENE: INDEPENDENT

## 2018-03-20 NOTE — PLAN OF CARE
Problem: Weight/Appetite Change (Depressive Signs/Symptoms) (Adult)  Goal: Nutrition/Weight Optimized (Depressive Signs/Symptoms)  Outcome: No Change  48 hour nursing assessment:  Day 5 of hospitalization.  Lorena has not eaten today.  Yesterday she had a ginger ale on evenings.  Physically she looks a bit better (when she comes out from under her covers).  She said she vomited last night a large amount.  She thinks her body is reacting to the lack of alcohol.  However, she doesn't think she's in withdrawal.  She denies SI/SIB.  She wants to go to treatment.

## 2018-03-20 NOTE — PROGRESS NOTES
"This RN asked patient if she wanted any of her morning medications and she declined including the suppository.  She later told a psych. tech. that she had small bouts of urinary incontinence last night.  Suppository was offered to her but she declined saying the nurse last night told her \"the liquid medication would work in a day.\"  "

## 2018-03-20 NOTE — PROGRESS NOTES
The pt had a mildly rough shift, as she was unable to keep even fluids down.  She went off unit to X-ray, had a fluid IV for an hour, and was vomiting a lot.  She related to this writer that she was unable to keep anything down, and afraid that this might be a side effect of her long drinking problem.  The pt was social with others, was visible in the milieu, and pleasant and appropriate.  The pt was unable to eat dinner.  The pt denies SI/SIb urges.     03/19/18 2158   Behavioral Health   Hallucinations denies / not responding to hallucinations   Thinking poor concentration   Orientation person: oriented;place: oriented   Memory baseline memory   Insight poor   Judgement impaired   Eye Contact at examiner   Affect sad;blunted, flat   Mood depressed;mood is calm   Physical Appearance/Attire attire appropriate to age and situation   Hygiene neglected grooming - unclean body, hair, teeth   Suicidality other (see comments)  (pt denies)   1. Wish to be Dead No   2. Non-Specific Active Suicidal Thoughts  No   Self Injury other (see comment)  (pt denies)   Elopement (no attempts)   Activity other (see comment)  (visible at times, )   Speech clear;coherent   Medication Sensitivity no stated side effects   Psychomotor / Gait slow   Safety   Suicidality Status 15   Coping/Psychosocial   Verbalized Emotional State anxiety;depression;fear;hopelessness;sadness   Psycho Education   Type of Intervention 1:1 intervention   Response participates, initiates socially appropriate   Hours 0.5   Treatment Detail check-in   Group Therapy Session   Group Attendance other (see comments);excused from group session  (pt was sick in her room)   Activities of Daily Living   Hygiene/Grooming independent   Oral Hygiene independent   Dress scrubs (behavioral health)   Room Organization independent

## 2018-03-21 PROCEDURE — 12400007 ZZH R&B MH INTERMEDIATE UMMC

## 2018-03-21 PROCEDURE — 25000132 ZZH RX MED GY IP 250 OP 250 PS 637: Performed by: PSYCHIATRY & NEUROLOGY

## 2018-03-21 PROCEDURE — 99232 SBSQ HOSP IP/OBS MODERATE 35: CPT | Performed by: PHYSICIAN ASSISTANT

## 2018-03-21 PROCEDURE — 25000132 ZZH RX MED GY IP 250 OP 250 PS 637: Performed by: PHYSICIAN ASSISTANT

## 2018-03-21 PROCEDURE — 25000125 ZZHC RX 250: Performed by: PSYCHIATRY & NEUROLOGY

## 2018-03-21 PROCEDURE — 99231 SBSQ HOSP IP/OBS SF/LOW 25: CPT | Performed by: PSYCHIATRY & NEUROLOGY

## 2018-03-21 RX ORDER — METRONIDAZOLE 500 MG/1
500 TABLET ORAL 2 TIMES DAILY
Status: DISCONTINUED | OUTPATIENT
Start: 2018-03-21 | End: 2018-03-22 | Stop reason: HOSPADM

## 2018-03-21 RX ADMIN — BUSPIRONE HYDROCHLORIDE 15 MG: 15 TABLET ORAL at 20:38

## 2018-03-21 RX ADMIN — ONDANSETRON 8 MG: 4 TABLET, ORALLY DISINTEGRATING ORAL at 09:04

## 2018-03-21 RX ADMIN — METRONIDAZOLE 500 MG: 500 TABLET ORAL at 09:04

## 2018-03-21 RX ADMIN — POLYETHYLENE GLYCOL-3350 AND ELECTROLYTES 1000 ML: 236; 6.74; 5.86; 2.97; 22.74 POWDER, FOR SOLUTION ORAL at 19:09

## 2018-03-21 RX ADMIN — NALTREXONE HYDROCHLORIDE 50 MG: 50 TABLET, FILM COATED ORAL at 09:04

## 2018-03-21 RX ADMIN — METRONIDAZOLE 500 MG: 500 TABLET ORAL at 20:38

## 2018-03-21 RX ADMIN — FAMOTIDINE 10 MG: 10 TABLET ORAL at 09:04

## 2018-03-21 RX ADMIN — BUSPIRONE HYDROCHLORIDE 15 MG: 15 TABLET ORAL at 13:31

## 2018-03-21 RX ADMIN — BUSPIRONE HYDROCHLORIDE 15 MG: 15 TABLET ORAL at 09:04

## 2018-03-21 RX ADMIN — FOLIC ACID 1 MG: 1 TABLET ORAL at 09:04

## 2018-03-21 RX ADMIN — FAMOTIDINE 10 MG: 10 TABLET ORAL at 20:38

## 2018-03-21 RX ADMIN — ONDANSETRON 4 MG: 4 TABLET, ORALLY DISINTEGRATING ORAL at 20:40

## 2018-03-21 RX ADMIN — DULOXETINE HYDROCHLORIDE 60 MG: 60 CAPSULE, DELAYED RELEASE ORAL at 09:04

## 2018-03-21 RX ADMIN — SORBITOL SOLUTION (BULK) 30 ML: 70 SOLUTION at 09:04

## 2018-03-21 RX ADMIN — ONDANSETRON 8 MG: 4 TABLET, ORALLY DISINTEGRATING ORAL at 13:31

## 2018-03-21 RX ADMIN — TRAZODONE HYDROCHLORIDE 50 MG: 50 TABLET ORAL at 20:40

## 2018-03-21 ASSESSMENT — ACTIVITIES OF DAILY LIVING (ADL)
ORAL_HYGIENE: INDEPENDENT
GROOMING: INDEPENDENT
DRESS: INDEPENDENT

## 2018-03-21 NOTE — PROGRESS NOTES
"   03/20/18 1940   Behavioral Health   Hallucinations denies / not responding to hallucinations   Thinking intact   Orientation person: oriented;place: oriented;date: oriented;time: oriented   Memory baseline memory   Insight insight appropriate to situation;insight appropriate to events   Judgement intact   Eye Contact at examiner   Affect full range affect   Mood anxious   Physical Appearance/Attire attire appropriate to age and situation   Hygiene well groomed   Suicidality (denies)   1. Wish to be Dead No   2. Non-Specific Active Suicidal Thoughts  No   Self Injury (denies)   Elopement (none observed)   Activity (active and participatory )   Speech coherent   Medication Sensitivity no observed side effects;no stated side effects   Psychomotor / Gait balanced   Activities of Daily Living   Hygiene/Grooming independent   Oral Hygiene independent   Dress independent   Laundry with supervision   Room Organization independent   Lorena reported feeling an 8 out of 10 today--with 10 being the highest.  When asked why, Lorena explained that she feels better physically, because she ate something today that stayed down and that she got a chance to shower.  Lorena denied having any self-injurious thoughts or suicidal thoughts.  The goal Lorena mentioned was to \"go back to rehab and get better.\"  "

## 2018-03-21 NOTE — PLAN OF CARE
Problem: Mood Impairment (Depressive Signs/Symptoms) (Adult)  Goal: Improved Mood Symptoms (Depressive Signs/Symptoms)  Outcome: Improving  Lorena was able to eat 2 chicken strips last night and some scrambled eggs today.  She also took most of her medications this morning which is a first.  Admits to feeling better.  MSSA is 6 mainly due to a pulse of 103.

## 2018-03-21 NOTE — PROGRESS NOTES
[facility], [place]   Psychiatric Progress Note        Interim History:   The patient's care was discussed with the treatment team during the daily team meeting and/or staff's chart notes were reviewed.  Staff report patient spent most of weekend in her room. She reported that, overall, she had been feeling better. Started eating, had stool, denied presence of Suicidal ideation, appeared to be more animated and future focused. Said that she would like to go to [place] tomorrow. Was seen by IM, see IM's note below.   ASSESSMENT & PLAN: Lorena Holden is a 37 year old female with a history of endometriosis, chronic pelvic pain, alcohol abuse, depression, anxiety, and PTSD who was admitted to inpatient behavioral health on 3/15/18 with suicidal ideation. Internal Medicine following for nausea, vomiting, abdominal pain, poor PO intake, and constipation.     Constipation - Likely due to poor PO intake, medications (Zyprexa, Valium), and hx of multiple prior abdominal surgeries. AXR (3/19) with non-obstructive bowel gas pattern and moderate colonic stool burden. No BM despite increased bowel regimen.  - Give 1000 mL GoLytely as this has worked well for patient in the past. Can give additional doses if no result.  - Daily Dulcolax suppository (note patient has been refusing)  - Continue Miralax 17 g BID   - Continue Sorbitol 30 mL QD  - Declined enema  - Notify Medicine if hemodynamic instability, fever >101, or new/worsening symptoms     BV - Diagnosed with BV prior to admission and prescribed Flagyl on 3/3/18 for 1 week. Upon hospital admission, was noted to have 11 tablets remaining in prescription bottles so Flagyl was resumed and has received 6/11 remaining doses.  - Continue Flagyl 500 mg BID for 5 additional doses (ending 3/23)  - Of note, disulfiram like reaction can occur with alcohol consumption while on Flagyl     NAGMA - Bicarb 15-18 since 3/19, previously WNL. Normal AG. Suspect due  to bicarb losses from frequent vomiting. Follow up with PCP within 1 week of discharge for repeat BMP.     Resolved Hospital Issues  Nausea, Vomiting - Likely due to alcohol withdrawal. Use of Flagyl PTA may also have been contributing. Symptoms resolved with completion of withdrawal. Continue Zofran PRN.  Dysuria - Possibly due to dehydration vs BV. UA (3/19) without evidence of infection. Symptoms have since resolved.  For more details, please, see Internal Medicine note. Appreciate medical team's input.        Medications:       polyethylene glycol  1,000 mL Oral Once     metroNIDAZOLE  500 mg Oral BID     sorbitol  30 mL Oral Daily     famotidine  10 mg Oral BID     bisacodyl  10 mg Rectal Daily     polyethylene glycol  17 g Oral BID     OLANZapine zydis  5 mg Oral Once     busPIRone  15 mg Oral TID     DULoxetine  60 mg Oral Daily     fluticasone  2 spray Both Nostrils Daily     naltrexone  50 mg Oral Daily     folic acid  1 mg Oral Daily     multivitamin, therapeutic with minerals  1 tablet Oral Daily          Allergies:     Allergies   Allergen Reactions     Bee Venom Anaphylaxis     Compazine [Prochlorperazine] Anaphylaxis     Edisylate/Maleate          Labs:     No results found for this or any previous visit (from the past 24 hour(s)).       Psychiatric Examination:     /86  Pulse 98  Temp 98.5  F (36.9  C) (Oral)  Resp 16  LMP 03/10/2018 (Exact Date)  SpO2 98%  Weight is 0 lbs 0 oz  There is no height or weight on file to calculate BMI.            Lying Orthostatic BP: 123/94         Sitting Orthostatic BP: 114/69         Standing Orthostatic BP: 132/78     Appearance: awake, alert and dressed in hospital scrubs  Attitude: cooperative  Eye Contact:  fair  Mood:  More stable  Affect:  appropriate and in normal range  Speech:  clear, coherent  Psychomotor Behavior:  no evidence of tardive dyskinesia, dystonia, or tics and intact station, gait and muscle tone  Throught Process:  logical and  linear  Associations:  no loose associations  Thought Content:  Denies both active and passive Suicidal ideation.  Insight:  partial  Judgement:  fair  Oriented to:  time, person, and place  Attention Span and Concentration:  intact  Recent and Remote Memory:  fair    Clinical Global Impressions  First:  Considering your total clinical experience with this particular patient population, how severe are the patient's symptoms at this time?: 7 (03/16/18 1451)  Compared to the patient's condition at the START of treatment, this patient's condition is:: 4 (03/16/18 1451)  Most recent:  Considering your total clinical experience with this particular patient population, how severe are the patient's symptoms at this time?: 7 (03/16/18 1451)  Compared to the patient's condition at the START of treatment, this patient's condition is:: 4 (03/16/18 1451)    # Pain Assessment:   Current Pain Score 3/21/2018 3/17/2018 7/16/2017   Patient currently in pain? yes no denies   Pain score (0-10) - - -   Pain location Abdomen - -   Pain descriptors - - -   - Lorena is experiencing pain due to constipation.. Pain management was discussed and the plan was created in a collaborative fashion.  Lorena's response to the current recommendations: was seen by IM, see discussion above.  - Pharmacologic adjuvants: NSAIDs and Acetaminophen, takes laxatives.               Precautions:     Behavioral Orders   Procedures     Code 2     Routine Programming     As clinically indicated     Status 15     Every 15 minutes.     Suicide precautions     Withdrawal precautions          DIagnoses:     1.  Major depressive disorder, recurrent, moderate severity.   2.  Posttraumatic stress disorder.   3.  Alcohol use disorder, moderate severity.     4.  Possible borderline personality disorder.          Plan:     Patient was seen by Internal Medicine. Patient's emotional lability seems to correlate closely with her physical discomfort. No medication changes today.  She is doing better. Patient has a bed waiting for her at [facility]. Will, likely, be discharged there tomorrow.

## 2018-03-21 NOTE — PROGRESS NOTES
BRIEF INTERNAL MEDICINE PROGRESS NOTE     Lorena Holden  : 1980  MRN # [id number]  3/21/2018    ASSESSMENT & PLAN: Lorena Holden is a 37 year old female with a history of endometriosis, chronic pelvic pain, alcohol abuse, depression, anxiety, and PTSD who was admitted to inpatient behavioral health on 3/15/18 with suicidal ideation. Internal Medicine following for nausea, vomiting, abdominal pain, poor PO intake, and constipation.    Constipation - Likely due to poor PO intake, medications (Zyprexa, Valium), and hx of multiple prior abdominal surgeries. AXR (3/19) with non-obstructive bowel gas pattern and moderate colonic stool burden. No BM despite increased bowel regimen.  - Give 1000 mL GoLytely as this has worked well for patient in the past. Can give additional doses if no result.  - Daily Dulcolax suppository (note patient has been refusing)  - Continue Miralax 17 g BID   - Continue Sorbitol 30 mL QD  - Declined enema  - Notify Medicine if hemodynamic instability, fever >101, or new/worsening symptoms    BV - Diagnosed with BV prior to admission and prescribed Flagyl on 3/3/18 for 1 week. Upon hospital admission, was noted to have 11 tablets remaining in prescription bottles so Flagyl was resumed and has received 6/11 remaining doses.  - Continue Flagyl 500 mg BID for 5 additional doses (ending 3/23)  - Of note, disulfiram like reaction can occur with alcohol consumption while on Flagyl     NAGMA - Bicarb 15-18 since 3/19, previously WNL. Normal AG. Suspect due to bicarb losses from frequent vomiting. Follow up with PCP within 1 week of discharge for repeat BMP.    Resolved Hospital Issues  Nausea, Vomiting - Likely due to alcohol withdrawal. Use of Flagyl PTA may also have been contributing. Symptoms resolved with completion of withdrawal. Continue Zofran PRN.  Dysuria - Possibly due to dehydration vs BV. UA (3/19) without evidence of infection. Symptoms have since resolved.    Medicine  "will continue to follow for constipation.    Vane Fitch PA-C  Hospitalist Service  [phone]    INTERVAL HISTORY:   Medicine following for n/v, constipation, and dysuria. Lorena reports she is feeling \"much better\" today. She is no longer having nausea or vomiting. She tolerated chicken strips for dinner last night and eggs for breakfast this morning. She has not yet had a BM. She is having mild lower abdominal cramping.    PHYSICAL EXAM:  Blood pressure 115/86, pulse 98, temperature 98.5  F (36.9  C), temperature source Oral, resp. rate 16, last menstrual period 03/10/2018, SpO2 98 %.  GENERAL: Awake. Appears comfortable and in NAD.   HEENT: NC/AT. Anicteric sclera. Mucous membranes moist.  GI: Soft, non-tender, and non distended with hypoactive bowel sounds.   EXTREMITIES: No peripheral edema. Warm & well perfused.  NEUROLOGIC: Alert and orientated x 3. Moves all extremities. No focal deficits.   SKIN: No jaundice or rashes on exposed areas of skin.    LABS:  Reviewed    RECENT IMAGING/PROCEDURES:   Reviewed        "

## 2018-03-21 NOTE — DISCHARGE INSTRUCTIONS
Behavioral Discharge Planning and Instructions    [facility]    Summary:  You were admitted on 3/15/2018 due to suicidal ideation.  You were treated by Dr. Feliciano Moore MD and discharged on 3/22/18 from [facility] to [facility].      Principal Diagnosis: Major Depressive Disorder      Health Care Follow-up Appointments:   Please Make follow up appointment with your providers as you get closer to discharging from [facility].    Attend all scheduled appointments with your outpatient providers. Call at least 24 hours in advance if you need to reschedule an appointment to ensure continued access to your outpatient providers.   Major Treatments, Procedures and Findings:  You were provided with: a psychiatric assessment, assessed for medical stability and medication evaluation and/or management    Symptoms to Report: feeling more aggressive, increased confusion, losing more sleep, mood getting worse or thoughts of suicide    Early warning signs can include: increased depression or anxiety sleep disturbances increased thoughts or behaviors of suicide or self-harm  increased unusual thinking, such as paranoia or hearing voices    Safety and Wellness:  Take all medicines as directed.  Make no changes unless your doctor suggests them.      Follow treatment recommendations.  Refrain from alcohol and non-prescribed drugs.  If there is a concern for safety, call 911.    Resources:   Crisis Intervention: 575.242.7585 or 688-272-1937 (TTY: 589.704.9815).  Call anytime for help.  National [employer] Mental Illness (www.mn.araceli.org): 140.610.1270 or 047-671-8043.  Alcoholics Anonymous (www.alcoholics-anonymous.org): Check your phone book for your local chapter.  [facility] Crisis (COPE) Response - Adult [phone]    The treatment team has appreciated the opportunity to work with you.     If you have any questions or concerns our unit number is [phone].  You may be receiving a  follow-up phone call within the next three days from a representative from behavioral health.

## 2018-03-21 NOTE — PROGRESS NOTES
03/21/18 1236   Behavioral Health   Hallucinations denies / not responding to hallucinations   Thinking poor concentration   Orientation person: oriented;place: oriented;date: oriented   Memory baseline memory   Insight admits / accepts   Judgement impaired   Eye Contact at examiner   Affect blunted, flat   Mood mood is calm;other (see comments)  (Hopeful)   Physical Appearance/Attire attire appropriate to age and situation   Hygiene well groomed   Suicidality other (see comments)  (Denies)   1. Wish to be Dead No   2. Non-Specific Active Suicidal Thoughts  No   Self Injury other (see comment)  (Denies)   Elopement (Nothing to report)   Activity withdrawn;isolative   Speech clear;coherent   Medication Sensitivity no observed side effects   Psychomotor / Gait balanced;steady   Psycho Education   Type of Intervention 1:1 intervention   Response participates, initiates socially appropriate   Hours 0.5   Treatment Detail (Check In)   Activities of Daily Living   Hygiene/Grooming independent   Oral Hygiene independent   Dress independent   Room Organization independent   Patient was in her room most of the shift.  The patient does report her apatite has improved and she is able to keep food down, but does remain nauseous.  The patient reports feeling much better and feels ready to go to treatment.  The patient reports she just started feeling better the evening before and thinks that by 3/22/18 she will be stable from detox and ready to go to her CD treatment program at [place].  The patient did eat during this shift without vomiting.  The patient did stay in her room most of the shift and was sleeping most of the time.

## 2018-03-22 VITALS
DIASTOLIC BLOOD PRESSURE: 77 MMHG | HEART RATE: 95 BPM | RESPIRATION RATE: 16 BRPM | TEMPERATURE: 97.4 F | SYSTOLIC BLOOD PRESSURE: 109 MMHG | BODY MASS INDEX: 23.65 KG/M2 | OXYGEN SATURATION: 98 % | WEIGHT: 151 LBS

## 2018-03-22 PROCEDURE — 25000132 ZZH RX MED GY IP 250 OP 250 PS 637: Performed by: PSYCHIATRY & NEUROLOGY

## 2018-03-22 PROCEDURE — 25000125 ZZHC RX 250: Performed by: PSYCHIATRY & NEUROLOGY

## 2018-03-22 PROCEDURE — 99207 ZZC CDG-CODE INCORRECT PER BILLING BASED ON TIME: CPT | Performed by: PSYCHIATRY & NEUROLOGY

## 2018-03-22 PROCEDURE — 99232 SBSQ HOSP IP/OBS MODERATE 35: CPT | Performed by: PHYSICIAN ASSISTANT

## 2018-03-22 PROCEDURE — 25000132 ZZH RX MED GY IP 250 OP 250 PS 637: Performed by: PHYSICIAN ASSISTANT

## 2018-03-22 PROCEDURE — 99238 HOSP IP/OBS DSCHRG MGMT 30/<: CPT | Performed by: PSYCHIATRY & NEUROLOGY

## 2018-03-22 RX ORDER — HYDROXYZINE HYDROCHLORIDE 25 MG/1
25 TABLET, FILM COATED ORAL EVERY 4 HOURS PRN
Qty: 120 TABLET | Refills: 1 | Status: ON HOLD | OUTPATIENT
Start: 2018-03-22 | End: 2019-09-03

## 2018-03-22 RX ORDER — FAMOTIDINE 10 MG
10 TABLET ORAL 2 TIMES DAILY
Qty: 60 TABLET | Refills: 1 | Status: ON HOLD | OUTPATIENT
Start: 2018-03-22 | End: 2019-09-03

## 2018-03-22 RX ORDER — POLYETHYLENE GLYCOL 3350 17 G/17G
17 POWDER, FOR SOLUTION ORAL 2 TIMES DAILY
Qty: 60 PACKET | Refills: 1 | Status: ON HOLD | OUTPATIENT
Start: 2018-03-22 | End: 2019-09-03

## 2018-03-22 RX ORDER — BISACODYL 10 MG
10 SUPPOSITORY, RECTAL RECTAL DAILY PRN
Qty: 30 SUPPOSITORY | Refills: 1 | Status: ON HOLD | OUTPATIENT
Start: 2018-03-22 | End: 2019-09-03

## 2018-03-22 RX ORDER — BUSPIRONE HYDROCHLORIDE 15 MG/1
15 TABLET ORAL 3 TIMES DAILY
Qty: 90 TABLET | Refills: 1 | Status: ON HOLD | OUTPATIENT
Start: 2018-03-22 | End: 2019-09-03

## 2018-03-22 RX ORDER — MULTIPLE VITAMINS W/ MINERALS TAB 9MG-400MCG
1 TAB ORAL DAILY
Qty: 30 EACH | Refills: 0 | Status: ON HOLD | OUTPATIENT
Start: 2018-03-23 | End: 2019-09-03

## 2018-03-22 RX ORDER — METRONIDAZOLE 500 MG/1
500 TABLET ORAL 2 TIMES DAILY
Qty: 3 TABLET | Refills: 0 | Status: SHIPPED | OUTPATIENT
Start: 2018-03-22 | End: 2018-03-24

## 2018-03-22 RX ORDER — IBUPROFEN 400 MG/1
400 TABLET, FILM COATED ORAL 3 TIMES DAILY PRN
Qty: 60 TABLET | Refills: 1 | Status: ON HOLD | OUTPATIENT
Start: 2018-03-22 | End: 2019-09-03

## 2018-03-22 RX ORDER — PROPRANOLOL HYDROCHLORIDE 10 MG/1
10 TABLET ORAL 3 TIMES DAILY PRN
Qty: 90 TABLET | Refills: 1 | Status: ON HOLD | OUTPATIENT
Start: 2018-03-22 | End: 2019-09-03

## 2018-03-22 RX ORDER — POLYETHYLENE GLYCOL 3350 17 G/17G
17 POWDER, FOR SOLUTION ORAL DAILY PRN
Qty: 7 PACKET | Refills: 1 | Status: ON HOLD | OUTPATIENT
Start: 2018-03-22 | End: 2019-09-03

## 2018-03-22 RX ADMIN — ONDANSETRON 8 MG: 4 TABLET, ORALLY DISINTEGRATING ORAL at 13:32

## 2018-03-22 RX ADMIN — METRONIDAZOLE 500 MG: 500 TABLET ORAL at 09:01

## 2018-03-22 RX ADMIN — FOLIC ACID 1 MG: 1 TABLET ORAL at 09:01

## 2018-03-22 RX ADMIN — MULTIPLE VITAMINS W/ MINERALS TAB 1 TABLET: TAB at 09:01

## 2018-03-22 RX ADMIN — BUSPIRONE HYDROCHLORIDE 15 MG: 15 TABLET ORAL at 09:01

## 2018-03-22 RX ADMIN — POLYETHYLENE GLYCOL 3350 17 G: 17 POWDER, FOR SOLUTION ORAL at 09:59

## 2018-03-22 RX ADMIN — DULOXETINE HYDROCHLORIDE 60 MG: 60 CAPSULE, DELAYED RELEASE ORAL at 09:01

## 2018-03-22 RX ADMIN — NALTREXONE HYDROCHLORIDE 50 MG: 50 TABLET, FILM COATED ORAL at 09:01

## 2018-03-22 RX ADMIN — BISACODYL 10 MG: 10 SUPPOSITORY RECTAL at 09:00

## 2018-03-22 RX ADMIN — ONDANSETRON 8 MG: 4 TABLET, ORALLY DISINTEGRATING ORAL at 09:04

## 2018-03-22 RX ADMIN — BUSPIRONE HYDROCHLORIDE 15 MG: 15 TABLET ORAL at 13:32

## 2018-03-22 RX ADMIN — FAMOTIDINE 10 MG: 10 TABLET ORAL at 09:01

## 2018-03-22 ASSESSMENT — ACTIVITIES OF DAILY LIVING (ADL)
ORAL_HYGIENE: INDEPENDENT
LAUNDRY: WITH SUPERVISION
DRESS: INDEPENDENT
GROOMING: INDEPENDENT

## 2018-03-22 NOTE — PLAN OF CARE
Problem: Overarching Goals (Adult)  Goal: Adheres to Safety Considerations for Self and Others  Outcome: Improving  48 Hour Assessment    /77  Pulse 95  Temp 98.4  F (36.9  C) (Oral)  Resp 16  LMP 03/10/2018 (Exact Date)  SpO2 98%    Pt visible in the milieu. She was appropriate and pleasant with staff and patients. Pt reports that she is looking forward to staying sober, and following through with her treatment plans.     SI/SIB: Pt denies. States that she feels hopeful for the future, and for discharge.    Auditory/Visual Hallucinations: Pt denies. Does not appear responding.    Pt was administered Golytely this evening, and as of this note, had not had a BM. Will continue to assess. Due to this, she requested that her hs dose of Miralax be skipped. Requested, and given, prn trazodone for sleep, and Zofran for nausea.     No additional concerns at this time. Will continue to offer support.

## 2018-03-22 NOTE — PROGRESS NOTES
"Brief Medicine Note    Medicine following for constipation. Lorena reports she has not yet had a BM despite 1000 mL GoLytely and Dulcolax suppository. She is passing gas. Last BM was 3/19 and consisted of several small \"rabbit robert.\" She reports chronic constipation since prior abdominal surgeries for endometriosis. Frequency of BMs varies from daily to every 10 days as an outpatient. No nausea, vomiting, or abdominal pain. Tolerating PO intake. Drinking plenty of fluids. Patient discharging today to CD treatment.    /77  Pulse 95  Temp 97.4  F (36.3  C) (Oral)  Resp 16  Wt 68.5 kg (151 lb)  LMP 03/10/2018 (Exact Date)  SpO2 98%  BMI 23.65 kg/m2  General - Awake. Non-toxic appearing. NAD.  HEENT - NC/AT. Anicteric sclera. MMM.  GI - Soft, non-tender, and non-distended. Hypoactive bowel sounds.  Extremities - No peripheral edema. WWP.  Skin - No rashes or jaundice.    Assessment and Plan  Constipation - Likely due to poor PO intake, medications (Zyprexa, Valium), and hx of multiple prior abdominal surgeries. AXR (3/19) with non-obstructive bowel gas pattern and moderate colonic stool burden. Last BM 3/19, but consisted of several small hard stools. Passing flatus. No n/v or abdominal pain. Tolerating PO intake. Psychiatry did not notify Medicine of discharge plans or ask for bowel med recommendations upon discharge.  - Discharged on Miralax BID, Milk of Magnesia PRN, and Dulcolax Suppositories PRN by Psychiatry  - Instructed patient to seek immediate medical attention if n/v, abdominal distention, no longer pass flatus, abdominal pain, or inability to tolerate PO intake develop  - Patient is planning to follow up with PCP in the next few days (Friday or Monday) for ongoing management.    Vane Fitch PA-C  Hospitalist Service  [phone]    "

## 2018-03-22 NOTE — PLAN OF CARE
Plan is for pt to discharge between 2 - 2.30 pm to go to [facility] in [facility]. Discharge teaching, including f/u care and medication teaching, has been completed with pt.  Pt denies SI/SIB/HI.  Pt's affect was bright and denies having any thoughts to hurt herself.
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
## 2018-05-26 NOTE — PROGRESS NOTES
03/16/18 1527   Behavioral Health   Hallucinations denies / not responding to hallucinations   Thinking intact   Orientation place: oriented;date: oriented;time: oriented;person: oriented   Memory baseline memory   Insight poor   Judgement impaired   Eye Contact at examiner   Affect blunted, flat;sad   Mood depressed;hopeless;shame/guilt;anxious   Physical Appearance/Attire attire appropriate to age and situation   Hygiene well groomed   Suicidality thoughts only   1. Wish to be Dead No   2. Non-Specific Active Suicidal Thoughts  No   3. Active Sucidal Ideation with any Methods (Not Plan) Without Intent to Act  No   Activities of Daily Living   Hygiene/Grooming independent   Oral Hygiene independent   Dress scrubs (behavioral health);independent   Laundry unable to complete   Room Organization independent     PT.seemed to be calm. PT. Did not attend groups. PT. Seemed to be isolative and withdrawn. PT. Told staff that she feels anxious. PT. Stated that she has suicidal thoughts.PT. Denies SIB.    Verbal - The patient responds to verbal stimuli by opening their eyes when someone speaks to them. The patient is not fully oriented to time, place, or person.

## 2019-05-08 ENCOUNTER — TRANSFERRED RECORDS (OUTPATIENT)
Dept: HEALTH INFORMATION MANAGEMENT | Facility: CLINIC | Age: 39
End: 2019-05-08

## 2019-06-17 ENCOUNTER — TRANSFERRED RECORDS (OUTPATIENT)
Dept: HEALTH INFORMATION MANAGEMENT | Facility: CLINIC | Age: 39
End: 2019-06-17

## 2019-07-24 ENCOUNTER — TRANSFERRED RECORDS (OUTPATIENT)
Dept: HEALTH INFORMATION MANAGEMENT | Facility: CLINIC | Age: 39
End: 2019-07-24

## 2019-08-30 RX ORDER — EPINEPHRINE 0.3 MG/.3ML
0.3 INJECTION SUBCUTANEOUS PRN
COMMUNITY

## 2019-08-30 RX ORDER — NORGESTIMATE AND ETHINYL ESTRADIOL 7DAYSX3 28
1 KIT ORAL DAILY
COMMUNITY

## 2019-08-30 RX ORDER — ALBUTEROL SULFATE 90 UG/1
2 AEROSOL, METERED RESPIRATORY (INHALATION) EVERY 4 HOURS PRN
Status: ON HOLD | COMMUNITY
End: 2019-09-03

## 2019-08-30 RX ORDER — VALACYCLOVIR HYDROCHLORIDE 500 MG/1
500-1000 TABLET, FILM COATED ORAL DAILY PRN
COMMUNITY

## 2019-09-03 ENCOUNTER — HOSPITAL ENCOUNTER (OUTPATIENT)
Facility: CLINIC | Age: 39
Setting detail: OBSERVATION
Discharge: HOME | End: 2019-09-04
Attending: COLON & RECTAL SURGERY | Admitting: COLON & RECTAL SURGERY
Payer: COMMERCIAL

## 2019-09-03 ENCOUNTER — SURGERY (OUTPATIENT)
Age: 39
End: 2019-09-03
Payer: COMMERCIAL

## 2019-09-03 ENCOUNTER — ANESTHESIA EVENT (OUTPATIENT)
Dept: SURGERY | Facility: CLINIC | Age: 39
End: 2019-09-03
Payer: COMMERCIAL

## 2019-09-03 ENCOUNTER — ANESTHESIA (OUTPATIENT)
Dept: SURGERY | Facility: CLINIC | Age: 39
End: 2019-09-03
Payer: COMMERCIAL

## 2019-09-03 DIAGNOSIS — R10.2 PELVIC PAIN IN FEMALE: Primary | ICD-10-CM

## 2019-09-03 LAB
B-HCG SERPL-ACNC: <1 IU/L (ref 0–5)
HGB BLD-MCNC: 13.3 G/DL (ref 11.7–15.7)

## 2019-09-03 PROCEDURE — 25000125 ZZHC RX 250: Performed by: COLON & RECTAL SURGERY

## 2019-09-03 PROCEDURE — 25000128 H RX IP 250 OP 636: Performed by: OBSTETRICS & GYNECOLOGY

## 2019-09-03 PROCEDURE — 25800030 ZZH RX IP 258 OP 636: Performed by: PHYSICIAN ASSISTANT

## 2019-09-03 PROCEDURE — 25000128 H RX IP 250 OP 636: Performed by: ANESTHESIOLOGY

## 2019-09-03 PROCEDURE — 25800030 ZZH RX IP 258 OP 636: Performed by: ANESTHESIOLOGY

## 2019-09-03 PROCEDURE — 25800030 ZZH RX IP 258 OP 636: Performed by: NURSE ANESTHETIST, CERTIFIED REGISTERED

## 2019-09-03 PROCEDURE — 84702 CHORIONIC GONADOTROPIN TEST: CPT | Performed by: COLON & RECTAL SURGERY

## 2019-09-03 PROCEDURE — 71000012 ZZH RECOVERY PHASE 1 LEVEL 1 FIRST HR: Performed by: COLON & RECTAL SURGERY

## 2019-09-03 PROCEDURE — 27210794 ZZH OR GENERAL SUPPLY STERILE: Performed by: COLON & RECTAL SURGERY

## 2019-09-03 PROCEDURE — 25000128 H RX IP 250 OP 636: Performed by: NURSE ANESTHETIST, CERTIFIED REGISTERED

## 2019-09-03 PROCEDURE — 88305 TISSUE EXAM BY PATHOLOGIST: CPT | Performed by: COLON & RECTAL SURGERY

## 2019-09-03 PROCEDURE — 25000132 ZZH RX MED GY IP 250 OP 250 PS 637: Performed by: COLON & RECTAL SURGERY

## 2019-09-03 PROCEDURE — 25800030 ZZH RX IP 258 OP 636: Performed by: COLON & RECTAL SURGERY

## 2019-09-03 PROCEDURE — 88305 TISSUE EXAM BY PATHOLOGIST: CPT | Mod: 26,59 | Performed by: COLON & RECTAL SURGERY

## 2019-09-03 PROCEDURE — 40000934 ZZH STATISTIC OUTPATIENT (NON-OBS) DAY

## 2019-09-03 PROCEDURE — 37000009 ZZH ANESTHESIA TECHNICAL FEE, EACH ADDTL 15 MIN: Performed by: COLON & RECTAL SURGERY

## 2019-09-03 PROCEDURE — 40000169 ZZH STATISTIC PRE-PROCEDURE ASSESSMENT I: Performed by: COLON & RECTAL SURGERY

## 2019-09-03 PROCEDURE — 71000013 ZZH RECOVERY PHASE 1 LEVEL 1 EA ADDTL HR: Performed by: COLON & RECTAL SURGERY

## 2019-09-03 PROCEDURE — 25800025 ZZH RX 258: Performed by: OBSTETRICS & GYNECOLOGY

## 2019-09-03 PROCEDURE — 36000087 ZZH SURGERY LEVEL 8 EA 15 ADDTL MIN: Performed by: COLON & RECTAL SURGERY

## 2019-09-03 PROCEDURE — 40000935 ZZH STATISTIC OUTPATIENT (NON-OBS) EVE

## 2019-09-03 PROCEDURE — 25800025 ZZH RX 258: Performed by: COLON & RECTAL SURGERY

## 2019-09-03 PROCEDURE — 85018 HEMOGLOBIN: CPT | Performed by: COLON & RECTAL SURGERY

## 2019-09-03 PROCEDURE — 36415 COLL VENOUS BLD VENIPUNCTURE: CPT | Performed by: COLON & RECTAL SURGERY

## 2019-09-03 PROCEDURE — 25000128 H RX IP 250 OP 636: Performed by: COLON & RECTAL SURGERY

## 2019-09-03 PROCEDURE — 25000132 ZZH RX MED GY IP 250 OP 250 PS 637: Performed by: OBSTETRICS & GYNECOLOGY

## 2019-09-03 PROCEDURE — 25000125 ZZHC RX 250: Performed by: NURSE ANESTHETIST, CERTIFIED REGISTERED

## 2019-09-03 PROCEDURE — 25000566 ZZH SEVOFLURANE, EA 15 MIN: Performed by: COLON & RECTAL SURGERY

## 2019-09-03 PROCEDURE — 25000131 ZZH RX MED GY IP 250 OP 636 PS 637: Performed by: ANESTHESIOLOGY

## 2019-09-03 PROCEDURE — 36000085 ZZH SURGERY LEVEL 8 1ST 30 MIN: Performed by: COLON & RECTAL SURGERY

## 2019-09-03 PROCEDURE — 37000008 ZZH ANESTHESIA TECHNICAL FEE, 1ST 30 MIN: Performed by: COLON & RECTAL SURGERY

## 2019-09-03 RX ORDER — BUPIVACAINE HYDROCHLORIDE 5 MG/ML
INJECTION, SOLUTION PERINEURAL PRN
Status: DISCONTINUED | OUTPATIENT
Start: 2019-09-03 | End: 2019-09-03 | Stop reason: HOSPADM

## 2019-09-03 RX ORDER — SODIUM CHLORIDE, SODIUM LACTATE, POTASSIUM CHLORIDE, CALCIUM CHLORIDE 600; 310; 30; 20 MG/100ML; MG/100ML; MG/100ML; MG/100ML
INJECTION, SOLUTION INTRAVENOUS CONTINUOUS
Status: DISCONTINUED | OUTPATIENT
Start: 2019-09-03 | End: 2019-09-03 | Stop reason: HOSPADM

## 2019-09-03 RX ORDER — HYDROMORPHONE HYDROCHLORIDE 1 MG/ML
.3-.5 INJECTION, SOLUTION INTRAMUSCULAR; INTRAVENOUS; SUBCUTANEOUS EVERY 10 MIN PRN
Status: DISCONTINUED | OUTPATIENT
Start: 2019-09-03 | End: 2019-09-03 | Stop reason: HOSPADM

## 2019-09-03 RX ORDER — OXYCODONE HYDROCHLORIDE 5 MG/1
10 TABLET ORAL
Status: DISCONTINUED | OUTPATIENT
Start: 2019-09-03 | End: 2019-09-03

## 2019-09-03 RX ORDER — ONDANSETRON 2 MG/ML
4 INJECTION INTRAMUSCULAR; INTRAVENOUS EVERY 6 HOURS PRN
Status: DISCONTINUED | OUTPATIENT
Start: 2019-09-03 | End: 2019-09-04 | Stop reason: HOSPADM

## 2019-09-03 RX ORDER — ALVIMOPAN 12 MG/1
12 CAPSULE ORAL ONCE
Status: COMPLETED | OUTPATIENT
Start: 2019-09-03 | End: 2019-09-03

## 2019-09-03 RX ORDER — NEOSTIGMINE METHYLSULFATE 1 MG/ML
VIAL (ML) INJECTION PRN
Status: DISCONTINUED | OUTPATIENT
Start: 2019-09-03 | End: 2019-09-03

## 2019-09-03 RX ORDER — NALOXONE HYDROCHLORIDE 0.4 MG/ML
.1-.4 INJECTION, SOLUTION INTRAMUSCULAR; INTRAVENOUS; SUBCUTANEOUS
Status: DISCONTINUED | OUTPATIENT
Start: 2019-09-03 | End: 2019-09-03 | Stop reason: HOSPADM

## 2019-09-03 RX ORDER — DEXAMETHASONE SODIUM PHOSPHATE 4 MG/ML
INJECTION, SOLUTION INTRA-ARTICULAR; INTRALESIONAL; INTRAMUSCULAR; INTRAVENOUS; SOFT TISSUE PRN
Status: DISCONTINUED | OUTPATIENT
Start: 2019-09-03 | End: 2019-09-03

## 2019-09-03 RX ORDER — MEPERIDINE HYDROCHLORIDE 25 MG/ML
12.5 INJECTION INTRAMUSCULAR; INTRAVENOUS; SUBCUTANEOUS
Status: DISCONTINUED | OUTPATIENT
Start: 2019-09-03 | End: 2019-09-03 | Stop reason: HOSPADM

## 2019-09-03 RX ORDER — OXYCODONE HYDROCHLORIDE 5 MG/1
5-10 TABLET ORAL EVERY 4 HOURS PRN
Qty: 30 TABLET | Refills: 0 | Status: SHIPPED | OUTPATIENT
Start: 2019-09-03

## 2019-09-03 RX ORDER — METOCLOPRAMIDE HYDROCHLORIDE 5 MG/ML
10 INJECTION INTRAMUSCULAR; INTRAVENOUS EVERY 6 HOURS
Status: DISCONTINUED | OUTPATIENT
Start: 2019-09-03 | End: 2019-09-04 | Stop reason: HOSPADM

## 2019-09-03 RX ORDER — ONDANSETRON 2 MG/ML
4 INJECTION INTRAMUSCULAR; INTRAVENOUS EVERY 30 MIN PRN
Status: DISCONTINUED | OUTPATIENT
Start: 2019-09-03 | End: 2019-09-03 | Stop reason: HOSPADM

## 2019-09-03 RX ORDER — SODIUM CHLORIDE, SODIUM LACTATE, POTASSIUM CHLORIDE, CALCIUM CHLORIDE 600; 310; 30; 20 MG/100ML; MG/100ML; MG/100ML; MG/100ML
INJECTION, SOLUTION INTRAVENOUS CONTINUOUS
Status: DISCONTINUED | OUTPATIENT
Start: 2019-09-03 | End: 2019-09-04 | Stop reason: HOSPADM

## 2019-09-03 RX ORDER — OXYCODONE HYDROCHLORIDE 5 MG/1
5-10 TABLET ORAL
Status: DISCONTINUED | OUTPATIENT
Start: 2019-09-03 | End: 2019-09-04 | Stop reason: HOSPADM

## 2019-09-03 RX ORDER — ONDANSETRON 4 MG/1
4 TABLET, ORALLY DISINTEGRATING ORAL EVERY 6 HOURS PRN
Status: DISCONTINUED | OUTPATIENT
Start: 2019-09-03 | End: 2019-09-04 | Stop reason: HOSPADM

## 2019-09-03 RX ORDER — MORPHINE SULFATE 2 MG/ML
1 INJECTION, SOLUTION INTRAMUSCULAR; INTRAVENOUS
Status: COMPLETED | OUTPATIENT
Start: 2019-09-03 | End: 2019-09-03

## 2019-09-03 RX ORDER — GLYCOPYRROLATE 0.2 MG/ML
INJECTION, SOLUTION INTRAMUSCULAR; INTRAVENOUS PRN
Status: DISCONTINUED | OUTPATIENT
Start: 2019-09-03 | End: 2019-09-03

## 2019-09-03 RX ORDER — PROPOFOL 10 MG/ML
INJECTION, EMULSION INTRAVENOUS PRN
Status: DISCONTINUED | OUTPATIENT
Start: 2019-09-03 | End: 2019-09-03

## 2019-09-03 RX ORDER — VECURONIUM BROMIDE 1 MG/ML
INJECTION, POWDER, LYOPHILIZED, FOR SOLUTION INTRAVENOUS PRN
Status: DISCONTINUED | OUTPATIENT
Start: 2019-09-03 | End: 2019-09-03

## 2019-09-03 RX ORDER — ONDANSETRON 4 MG/1
4 TABLET, ORALLY DISINTEGRATING ORAL ONCE
Status: COMPLETED | OUTPATIENT
Start: 2019-09-03 | End: 2019-09-03

## 2019-09-03 RX ORDER — FENTANYL CITRATE 0.05 MG/ML
25-50 INJECTION, SOLUTION INTRAMUSCULAR; INTRAVENOUS
Status: DISCONTINUED | OUTPATIENT
Start: 2019-09-03 | End: 2019-09-03 | Stop reason: HOSPADM

## 2019-09-03 RX ORDER — ACETAMINOPHEN 325 MG/1
650 TABLET ORAL EVERY 6 HOURS PRN
Status: DISCONTINUED | OUTPATIENT
Start: 2019-09-03 | End: 2019-09-04 | Stop reason: HOSPADM

## 2019-09-03 RX ORDER — HEPARIN SODIUM 5000 [USP'U]/.5ML
5000 INJECTION, SOLUTION INTRAVENOUS; SUBCUTANEOUS
Status: COMPLETED | OUTPATIENT
Start: 2019-09-03 | End: 2019-09-03

## 2019-09-03 RX ORDER — ONDANSETRON 4 MG/1
4-8 TABLET, ORALLY DISINTEGRATING ORAL EVERY 8 HOURS PRN
Qty: 4 TABLET | Refills: 0 | Status: SHIPPED | OUTPATIENT
Start: 2019-09-03

## 2019-09-03 RX ORDER — FLUTICASONE PROPIONATE 110 UG/1
2 AEROSOL, METERED RESPIRATORY (INHALATION) DAILY PRN
COMMUNITY

## 2019-09-03 RX ORDER — PROPOFOL 10 MG/ML
INJECTION, EMULSION INTRAVENOUS CONTINUOUS PRN
Status: DISCONTINUED | OUTPATIENT
Start: 2019-09-03 | End: 2019-09-03

## 2019-09-03 RX ORDER — DOCUSATE SODIUM 100 MG/1
100 CAPSULE, LIQUID FILLED ORAL 2 TIMES DAILY
Qty: 60 CAPSULE | Refills: 0 | Status: SHIPPED | OUTPATIENT
Start: 2019-09-03

## 2019-09-03 RX ORDER — KETOROLAC TROMETHAMINE 30 MG/ML
30 INJECTION, SOLUTION INTRAMUSCULAR; INTRAVENOUS
Status: DISCONTINUED | OUTPATIENT
Start: 2019-09-03 | End: 2019-09-04 | Stop reason: HOSPADM

## 2019-09-03 RX ORDER — ONDANSETRON 2 MG/ML
INJECTION INTRAMUSCULAR; INTRAVENOUS PRN
Status: DISCONTINUED | OUTPATIENT
Start: 2019-09-03 | End: 2019-09-03

## 2019-09-03 RX ORDER — CIPROFLOXACIN 2 MG/ML
400 INJECTION, SOLUTION INTRAVENOUS SEE ADMIN INSTRUCTIONS
Status: DISCONTINUED | OUTPATIENT
Start: 2019-09-03 | End: 2019-09-03 | Stop reason: HOSPADM

## 2019-09-03 RX ORDER — ONDANSETRON 4 MG/1
4 TABLET, ORALLY DISINTEGRATING ORAL EVERY 30 MIN PRN
Status: DISCONTINUED | OUTPATIENT
Start: 2019-09-03 | End: 2019-09-03 | Stop reason: HOSPADM

## 2019-09-03 RX ORDER — ACETAMINOPHEN 500 MG
TABLET ORAL DAILY PRN
COMMUNITY

## 2019-09-03 RX ORDER — NALOXONE HYDROCHLORIDE 0.4 MG/ML
.1-.4 INJECTION, SOLUTION INTRAMUSCULAR; INTRAVENOUS; SUBCUTANEOUS
Status: DISCONTINUED | OUTPATIENT
Start: 2019-09-03 | End: 2019-09-04 | Stop reason: HOSPADM

## 2019-09-03 RX ORDER — LIDOCAINE HYDROCHLORIDE 20 MG/ML
INJECTION, SOLUTION INFILTRATION; PERINEURAL PRN
Status: DISCONTINUED | OUTPATIENT
Start: 2019-09-03 | End: 2019-09-03

## 2019-09-03 RX ORDER — CIPROFLOXACIN 2 MG/ML
400 INJECTION, SOLUTION INTRAVENOUS
Status: COMPLETED | OUTPATIENT
Start: 2019-09-03 | End: 2019-09-03

## 2019-09-03 RX ORDER — SODIUM CHLORIDE, SODIUM LACTATE, POTASSIUM CHLORIDE, CALCIUM CHLORIDE 600; 310; 30; 20 MG/100ML; MG/100ML; MG/100ML; MG/100ML
INJECTION, SOLUTION INTRAVENOUS CONTINUOUS PRN
Status: DISCONTINUED | OUTPATIENT
Start: 2019-09-03 | End: 2019-09-03

## 2019-09-03 RX ORDER — ONDANSETRON 4 MG/1
4 TABLET, ORALLY DISINTEGRATING ORAL
Status: DISCONTINUED | OUTPATIENT
Start: 2019-09-03 | End: 2019-09-03

## 2019-09-03 RX ORDER — ACETAMINOPHEN 325 MG/1
975 TABLET ORAL ONCE
Status: COMPLETED | OUTPATIENT
Start: 2019-09-03 | End: 2019-09-03

## 2019-09-03 RX ORDER — IBUPROFEN 600 MG/1
600 TABLET, FILM COATED ORAL
Status: COMPLETED | OUTPATIENT
Start: 2019-09-03 | End: 2019-09-04

## 2019-09-03 RX ORDER — METHOCARBAMOL 750 MG/1
750 TABLET, FILM COATED ORAL
Status: COMPLETED | OUTPATIENT
Start: 2019-09-03 | End: 2019-09-04

## 2019-09-03 RX ORDER — FENTANYL CITRATE 50 UG/ML
INJECTION, SOLUTION INTRAMUSCULAR; INTRAVENOUS PRN
Status: DISCONTINUED | OUTPATIENT
Start: 2019-09-03 | End: 2019-09-03

## 2019-09-03 RX ORDER — HYDROXYZINE HYDROCHLORIDE 25 MG/1
25 TABLET, FILM COATED ORAL
Status: COMPLETED | OUTPATIENT
Start: 2019-09-03 | End: 2019-09-03

## 2019-09-03 RX ORDER — KETOROLAC TROMETHAMINE 30 MG/ML
30 INJECTION, SOLUTION INTRAMUSCULAR; INTRAVENOUS EVERY 6 HOURS PRN
Status: DISCONTINUED | OUTPATIENT
Start: 2019-09-03 | End: 2019-09-03 | Stop reason: HOSPADM

## 2019-09-03 RX ORDER — MAGNESIUM HYDROXIDE 1200 MG/15ML
LIQUID ORAL PRN
Status: DISCONTINUED | OUTPATIENT
Start: 2019-09-03 | End: 2019-09-03 | Stop reason: HOSPADM

## 2019-09-03 RX ORDER — CELECOXIB 200 MG/1
200 CAPSULE ORAL 2 TIMES DAILY
Qty: 30 CAPSULE | Refills: 0 | Status: SHIPPED | OUTPATIENT
Start: 2019-09-03

## 2019-09-03 RX ADMIN — BUPIVACAINE HYDROCHLORIDE 30 ML: 5 INJECTION, SOLUTION PERINEURAL at 08:58

## 2019-09-03 RX ADMIN — ACETAMINOPHEN 975 MG: 325 TABLET ORAL at 06:23

## 2019-09-03 RX ADMIN — ONDANSETRON 4 MG: 2 INJECTION INTRAMUSCULAR; INTRAVENOUS at 08:56

## 2019-09-03 RX ADMIN — KETOROLAC TROMETHAMINE 30 MG: 30 INJECTION, SOLUTION INTRAMUSCULAR at 16:14

## 2019-09-03 RX ADMIN — HYDROMORPHONE HYDROCHLORIDE 0.5 MG: 1 INJECTION, SOLUTION INTRAMUSCULAR; INTRAVENOUS; SUBCUTANEOUS at 09:24

## 2019-09-03 RX ADMIN — ONDANSETRON 4 MG: 4 TABLET, ORALLY DISINTEGRATING ORAL at 07:05

## 2019-09-03 RX ADMIN — SODIUM CHLORIDE, POTASSIUM CHLORIDE, SODIUM LACTATE AND CALCIUM CHLORIDE 500 ML: 600; 310; 30; 20 INJECTION, SOLUTION INTRAVENOUS at 16:09

## 2019-09-03 RX ADMIN — KETOROLAC TROMETHAMINE 30 MG: 30 INJECTION, SOLUTION INTRAMUSCULAR at 10:58

## 2019-09-03 RX ADMIN — SODIUM CHLORIDE 1000 ML: 900 IRRIGANT IRRIGATION at 07:55

## 2019-09-03 RX ADMIN — ALVIMOPAN 12 MG: 12 CAPSULE ORAL at 06:23

## 2019-09-03 RX ADMIN — FENTANYL CITRATE 50 MCG: 50 INJECTION, SOLUTION INTRAMUSCULAR; INTRAVENOUS at 09:14

## 2019-09-03 RX ADMIN — ONDANSETRON 4 MG: 2 INJECTION INTRAMUSCULAR; INTRAVENOUS at 14:04

## 2019-09-03 RX ADMIN — SODIUM CHLORIDE, POTASSIUM CHLORIDE, SODIUM LACTATE AND CALCIUM CHLORIDE: 600; 310; 30; 20 INJECTION, SOLUTION INTRAVENOUS at 07:06

## 2019-09-03 RX ADMIN — FENTANYL CITRATE 50 MCG: 50 INJECTION, SOLUTION INTRAMUSCULAR; INTRAVENOUS at 07:33

## 2019-09-03 RX ADMIN — LIDOCAINE HYDROCHLORIDE 100 MG: 20 INJECTION, SOLUTION INFILTRATION; PERINEURAL at 07:33

## 2019-09-03 RX ADMIN — HYDROXYZINE HYDROCHLORIDE 25 MG: 25 TABLET ORAL at 16:21

## 2019-09-03 RX ADMIN — OXYCODONE HYDROCHLORIDE 10 MG: 5 TABLET ORAL at 22:29

## 2019-09-03 RX ADMIN — HEPARIN SODIUM 5000 UNITS: 10000 INJECTION, SOLUTION INTRAVENOUS; SUBCUTANEOUS at 07:40

## 2019-09-03 RX ADMIN — FENTANYL CITRATE 50 MCG: 50 INJECTION, SOLUTION INTRAMUSCULAR; INTRAVENOUS at 08:43

## 2019-09-03 RX ADMIN — METHYLENE BLUE 100 ML: 5 INJECTION INTRAVENOUS at 08:59

## 2019-09-03 RX ADMIN — WATER 3000 ML: 100 IRRIGANT IRRIGATION at 08:53

## 2019-09-03 RX ADMIN — CIPROFLOXACIN 400 MG: 2 INJECTION INTRAVENOUS at 07:38

## 2019-09-03 RX ADMIN — METOCLOPRAMIDE 10 MG: 5 INJECTION, SOLUTION INTRAMUSCULAR; INTRAVENOUS at 18:21

## 2019-09-03 RX ADMIN — FENTANYL CITRATE 50 MCG: 50 INJECTION, SOLUTION INTRAMUSCULAR; INTRAVENOUS at 07:53

## 2019-09-03 RX ADMIN — NEOSTIGMINE METHYLSULFATE 4 MG: 1 INJECTION, SOLUTION INTRAVENOUS at 08:56

## 2019-09-03 RX ADMIN — FENTANYL CITRATE 50 MCG: 50 INJECTION, SOLUTION INTRAMUSCULAR; INTRAVENOUS at 09:12

## 2019-09-03 RX ADMIN — HYDROMORPHONE HYDROCHLORIDE 0.5 MG: 1 INJECTION, SOLUTION INTRAMUSCULAR; INTRAVENOUS; SUBCUTANEOUS at 10:54

## 2019-09-03 RX ADMIN — SODIUM CHLORIDE, POTASSIUM CHLORIDE, SODIUM LACTATE AND CALCIUM CHLORIDE: 600; 310; 30; 20 INJECTION, SOLUTION INTRAVENOUS at 07:00

## 2019-09-03 RX ADMIN — MORPHINE SULFATE 1 MG: 2 INJECTION, SOLUTION INTRAMUSCULAR; INTRAVENOUS at 14:06

## 2019-09-03 RX ADMIN — GLYCOPYRROLATE 0.6 MG: 0.2 INJECTION, SOLUTION INTRAMUSCULAR; INTRAVENOUS at 08:56

## 2019-09-03 RX ADMIN — VECURONIUM BROMIDE 5 MG: 1 INJECTION, POWDER, LYOPHILIZED, FOR SOLUTION INTRAVENOUS at 07:40

## 2019-09-03 RX ADMIN — MIDAZOLAM 2 MG: 1 INJECTION INTRAMUSCULAR; INTRAVENOUS at 07:30

## 2019-09-03 RX ADMIN — SODIUM CHLORIDE, POTASSIUM CHLORIDE, SODIUM LACTATE AND CALCIUM CHLORIDE: 600; 310; 30; 20 INJECTION, SOLUTION INTRAVENOUS at 08:52

## 2019-09-03 RX ADMIN — METOCLOPRAMIDE 10 MG: 5 INJECTION, SOLUTION INTRAMUSCULAR; INTRAVENOUS at 12:16

## 2019-09-03 RX ADMIN — SUCCINYLCHOLINE CHLORIDE 100 MG: 20 INJECTION, SOLUTION INTRAMUSCULAR; INTRAVENOUS; PARENTERAL at 07:33

## 2019-09-03 RX ADMIN — DEXAMETHASONE SODIUM PHOSPHATE 4 MG: 4 INJECTION, SOLUTION INTRA-ARTICULAR; INTRALESIONAL; INTRAMUSCULAR; INTRAVENOUS; SOFT TISSUE at 07:38

## 2019-09-03 RX ADMIN — HYDROMORPHONE HYDROCHLORIDE 0.5 MG: 1 INJECTION, SOLUTION INTRAMUSCULAR; INTRAVENOUS; SUBCUTANEOUS at 09:49

## 2019-09-03 RX ADMIN — PROPOFOL 30 MCG/KG/MIN: 10 INJECTION, EMULSION INTRAVENOUS at 07:38

## 2019-09-03 RX ADMIN — METRONIDAZOLE 500 MG: 500 INJECTION, SOLUTION INTRAVENOUS at 07:38

## 2019-09-03 RX ADMIN — OXYCODONE HYDROCHLORIDE 5 MG: 5 TABLET ORAL at 18:27

## 2019-09-03 RX ADMIN — PROPOFOL 190 MG: 10 INJECTION, EMULSION INTRAVENOUS at 07:33

## 2019-09-03 RX ADMIN — OXYCODONE HYDROCHLORIDE 5 MG: 5 TABLET ORAL at 19:27

## 2019-09-03 ASSESSMENT — ACTIVITIES OF DAILY LIVING (ADL)
AMBULATION: 0-->INDEPENDENT
TOILETING: 0-->INDEPENDENT
TRANSFERRING: 0-->INDEPENDENT
RETIRED_EATING: 0-->INDEPENDENT
SWALLOWING: 0-->SWALLOWS FOODS/LIQUIDS WITHOUT DIFFICULTY
DRESS: 0-->INDEPENDENT
COGNITION: 0 - NO COGNITION ISSUES REPORTED
RETIRED_COMMUNICATION: 0-->UNDERSTANDS/COMMUNICATES WITHOUT DIFFICULTY
FALL_HISTORY_WITHIN_LAST_SIX_MONTHS: NO
BATHING: 0-->INDEPENDENT

## 2019-09-03 ASSESSMENT — LIFESTYLE VARIABLES: TOBACCO_USE: 0

## 2019-09-03 ASSESSMENT — MIFFLIN-ST. JEOR: SCORE: 1407.53

## 2019-09-03 NOTE — BRIEF OP NOTE
[facility]    Brief Operative Note    Pre-operative diagnosis: ENDOMETRIOSIS  Post-operative diagnosis Same  Procedure: Procedure(s):  ROBOT-ASSISTED, USING DA SHANIKA XI EXCISION RECTAL ENDOMETRIOSIS  LAPAROSCOPY,EXCISION AND ABLATION OF ENDOMETRIOSIS. CYSTOSCOPY, TUBAL DYE STUDY  CYSTOSCOPY  DYE STUDY, FALLOPIAN TUBE  Surgeon: Surgeon(s) and Role:  Panel 1:     * Maged Jewell MD - Primary     * Mary Toussaint PA-C - Assisting  Panel 2:     * Ranulfo Maria MD - Primary  Panel 3:     * Ranulfo Maria MD - Primary  Anesthesia: General   Estimated blood loss: Minimal  Drains: None  Specimens:   ID Type Source Tests Collected by Time Destination   A : LEFT ILIAC FOSSA ENDOMETRIOSIS Tissue Other SURGICAL PATHOLOGY EXAM Maged Jewell MD 9/3/2019  8:11 AM    B : RIGHT ROUND LIGAMENT Tissue Other SURGICAL PATHOLOGY EXAM Maged Jewell MD 9/3/2019  8:22 AM    C : RIGHT OVARIAN INFLAMMATORY TISSUE Tissue Ovary, Right SURGICAL PATHOLOGY EXAM Maged Jewell MD 9/3/2019  8:27 AM    D : UTERINE INFLAMMATORY TISSUE Tissue Uterus SURGICAL PATHOLOGY EXAM Maged Jewell MD 9/3/2019  8:28 AM    E : LEFT BROAD LIGAMENT Tissue Other SURGICAL PATHOLOGY EXAM Maged Jewell MD 9/3/2019  8:31 AM    F : INFLAMMATORY TISSUE OF LEFT OVARY Tissue Ovary, Left SURGICAL PATHOLOGY EXAM Maged Jewell MD 9/3/2019  8:35 AM    G : POSTERIOR UTERUS INFLAMMATORY TISSUE Tissue Uterus SURGICAL PATHOLOGY EXAM Maged Jewell MD 9/3/2019  8:37 AM    H : RIGHT OVARIAN FOSSA Tissue Ovary, Right SURGICAL PATHOLOGY EXAM Maged Jewell MD 9/3/2019  8:39 AM      Findings:   Excision of endometriosis near left iliac fossa .  Complications: None.  Implants:  * No implants in log *     Condition on discharge from OR: Satisfactory    Feli Kramer PA-C   [facility]   [phone].        ADDENDUM:    PATIENT DATA  Indicate Y or N:  Home O2 No  Hemodialysis  No  Transplant  patient  No  Cirrhosis  No  Steroids in last 30 days  No  Immunomodulators in last 30 days  No  Anticoagulation at time of surgery  No  Prior abdominal surgery  Yes  Pelvic irradiation  No    Albumin within 30 days if known    Hgb within 30 days if known    Hemoglobin   Date Value Ref Range Status   09/03/2019 13.3 11.7 - 15.7 g/dL Final   ]  Cr within 30 days if known    Creatinine   Date Value Ref Range Status   03/20/2018 0.75 0.52 - 1.04 mg/dL Final   ]  Body mass index is 24.17 kg/m .      OR DATA  Emergent  No   <24 hours  No   <1 week  No  Bowel Prep Yes  Antibiotics  Yes  DVT prophylaxis    Heparin  Yes   SCD  No   None  No  Drain  No  ASA (1,2,3,4) 2  OR time (min) 60  Stents  No  Transfuse >/= 2U  No  Anastomosis   Stapled  No   Handsewn  No  Leak Test    Positive  No   Negative  No   Not done  Yes     Route (Have a good day)

## 2019-09-03 NOTE — OP NOTE
SURGEON: Ranulfo Maria MD  COSURGEON: Maged Jewell MD  ASSISTANT: GIULIA Barajas  PREOPERATIVE DIAGNOSIS: Endometriosis, Chronic Pelvic Pain  POSTOPERATIVE DIAGNOSIS: Same  OPERATION PERFORMED: Da Anthony Xi Operative Laparoscopy, Excision and ablation of endometriosis, Tubal dye study, cystoscopy  ANESTHESIA: GETA  EBL: 5 mL  FLUIDS: Lactated Ringers  URINE OUTPUT: 50 mL clear urine in Hsieh catheter  DRAINS: None  SPECIMENS: Right round ligament biopsy, right ovarian inflammatory tissue, uterine inflammatory tissue, left broad ligament biopsy, inflammatory tissue of the left ovary, posterior uterus inflammatory tissue, right ovarian fossa  COMPLICATIONS: None  FINDINGS: Scar tissue deposition in pelvis, normal anatomy.  Active endometriosis in the right ovarian fossa consistent with ASRM Stage 1.  Patent fallopian tubes bilaterally.  Normal bladder and ureteral openings and jets.  CONDITION: Stable, extubated, and transported to PACU.   INDICATIONS: 37yo F with a known history of Endometriosis Stage 2 with two prior operative laparoscopies.  She had a MRI that revealed no adenomyosis nor deeply infiltrating disease.  She opted for surgical management with colorectal due to her significant bowel complaints and bowel pains.  She desires future fertility and would like an evaluation of her fallopian tubes as well.    OPERATION: Informed consent was obtained for the procedure and the patient was taken to the operating room with IV running. She was placed in the dorsal supine position and general endotracheal anesthesia was obtained without difficulty and found to be adequate. The patient was then placed in a dorsal lithotomy position. The patient was prepped and draped in the normal sterile fashion. A perioperative time out was performed.   Attention was turned to the vagina where a Hsieh catheter was placed.  A Graves speculum was used to visualize the cervix.  A single tooth tenaculum was placed on the anterior  lip of the cervix and an acorn uterine manipulator was placed.  The Graves speculum was removed.  Attention was turned to the abdomen where the Veres needle was used in [place] in insufflate the abdomen to 15mm Hg.  A 8mm incision was made infraumbilically and the 8mm robotic camera trocar was placed.  The 30degree camera was placed and the insertion site without injury.  An 8mm incision was made in the left and right lower quadrants and robotic trocars were placed under direct visualization.  A10mm incision was made in the right upper quadrant and an assistant port was placed under direct supervision.  The Da Anthony Xi robot was docked.  At the time, Dr. Jewell sat at the console.  Please see his operative note for further details.  After Dr. Rendon was completed, I sat at the console.  The camera was switched to a 0 degree lens.  I meticulously inspected the pelvis, ablating an area overlying the right broad ligament, the right ovarian fossa, the posterior cul de sac, and the left ovarian fossa.  I excised inflammatory tissue from the anterior cul de sac, the left round ligament, the left ovary, the right ovary, the uterus, the left broad ligament, and the posterior uterus.  I lysed filmy adhesions overlying the left ureter that connected the overlying peritoneum to the broad ligament. I excised an area of endometriosis from the right ovarian fossa.  All surgical sites were hemostatic.    Methylene blue was then instilled into the uterus and good spill was seen from both fallopian tubes.  The pelvis was irrigated and suctioned.  The robot was undocked and the abdomen desufflated.  The trocars were removed and the incisions closed with 4-0 Vicryl with overlying dermabond.  Attention was turned to the vagina where the tenaculum and acorn uterine manipulator was removed.  Surgical sites were hemostatic.  The dobbins was removed and a cystoscope was inserted into the bladder.  The bladder was distended with  200cc of saline and survey was normal and without lesions.  The cystoscope was removed and the bladder drained.  The procedure was complete.  The patient was taken out of dorsal lithotomy position.  Anesthesia was reversed, she was extubated, stable, and transported to the PACU.  Needle and sponge count were correct.  She received neomycin and metronidazole prior to surgery.  She tolerated the procedure well.

## 2019-09-03 NOTE — ANESTHESIA POSTPROCEDURE EVALUATION
Patient: Lorena Holden    Procedure(s):  ROBOT-ASSISTED, USING DA SHANIKA XI EXCISION RECTAL ENDOMETRIOSIS  DAVINCI LAPAROSCOPY, EXCISION AND ABLATION OF ENDOMETRIOSIS, CYSTOSCOPY, TUBAL DYE STUDY  CYSTOSCOPY  DYE STUDY, FALLOPIAN TUBE    Diagnosis:ENDOMETRIOSIS  Diagnosis Additional Information: No value filed.    Anesthesia Type:  General, ETT, RSI    Note:  Anesthesia Post Evaluation    Patient location during evaluation: PACU  Patient participation: Able to fully participate in evaluation  Level of consciousness: awake and alert  Pain management: adequate  Airway patency: patent  Cardiovascular status: acceptable and stable  Respiratory status: acceptable, spontaneous ventilation, unassisted and nonlabored ventilation  Hydration status: acceptable  PONV: none             Last vitals:  Vitals:    09/03/19 0945 09/03/19 1000 09/03/19 1015   BP: 117/70 113/70 115/56   Pulse: 68 54 66   Resp: 17 8 13   Temp: 37.1  C (98.8  F)  37.4  C (99.4  F)   SpO2: 98% 97% 97%         Electronically Signed By: Leeroy Deras MD  September 3, 2019  10:36 AM

## 2019-09-03 NOTE — ANESTHESIA CARE TRANSFER NOTE
Patient: Lorena Holden    Procedure(s):  ROBOT-ASSISTED, USING DA SHANIKA XI EXCISION RECTAL ENDOMETRIOSIS  DAVINCI LAPAROSCOPY, EXCISION AND ABLATION OF ENDOMETRIOSIS, CYSTOSCOPY, TUBAL DYE STUDY  CYSTOSCOPY  DYE STUDY, FALLOPIAN TUBE    Diagnosis: ENDOMETRIOSIS  Diagnosis Additional Information: No value filed.    Anesthesia Type:   General, ETT, RSI     Note:  Airway :Face Mask  Patient transferred to:PACU  Handoff Report: Identifed the Patient, Identified the Reponsible Provider, Reviewed the pertinent medical history, Discussed the surgical course, Reviewed Intra-OP anesthesia mangement and issues during anesthesia, Set expectations for post-procedure period and Allowed opportunity for questions and acknowledgement of understanding      Vitals: (Last set prior to Anesthesia Care Transfer)    [facility] VITALS  9/3/2019 0839 - 9/3/2019 0914      9/3/2019             NIBP:  140/106  (Abnormal)     NIBP Mean:  113                Electronically Signed By: MIKY Mcgregor CRNA  September 3, 2019  9:14 AM

## 2019-09-03 NOTE — ANESTHESIA PREPROCEDURE EVALUATION
Anesthesia Pre-Procedure Evaluation    Patient: Lorena Holden   MRN: [id number] : 1980          Preoperative Diagnosis: ENDOMETRIOSIS    Procedure(s):  ROBOT-ASSISTED, USING DA SHANIKA XI EXCISION RECTAL ENDOMETRIOSIS  LAPAROSCOPY,EXCISION AND ABLATION OF ENDOMETRIOSIS. CYSTOSCOPY, TUBAL DYE STUDY  CYSTOSCOPY  DYE STUDY, FALLOPIAN TUBE    Past Medical History:   Diagnosis Date     Anxiety      Endometriosis      H/O ETOH abuse      Manic depression (H)      Other chronic pain     uterine pain     PTSD (post-traumatic stress disorder)      Uncomplicated asthma      Past Surgical History:   Procedure Laterality Date     APPENDECTOMY       BREAST SURGERY       CYSTOSCOPY N/A 2014    Procedure: CYSTOSCOPY;  Surgeon: Rajeev Temple MD;  Location:  OR     DAVINCI PELVIC PROCEDURE N/A 2014    Procedure: DAVINCI PELVIC PROCEDURE;  Surgeon: Rajeev Temple MD;  Location:  OR     DILATION AND CURETTAGE, HYSTEROSCOPY, ABLATE ENDOMETRIUM, COMBINED N/A 2014    Procedure: COMBINED DILATION AND CURETTAGE, HYSTEROSCOPY, ABLATE ENDOMETRIUM;  Surgeon: Rajeev Temple MD;  Location:  OR     GYN SURGERY       LAPAROSCOPIC APPENDECTOMY N/A 2014    Procedure: LAPAROSCOPIC APPENDECTOMY;  Surgeon: Rajeev Temple MD;  Location:  OR       Anesthesia Evaluation     . Pt has had prior anesthetic. Type: General    No history of anesthetic complications          ROS/MED HX    ENT/Pulmonary:     (+)Intermittent asthma Treatment: Inhaler prn,  , . .   (-) tobacco use and sleep apnea   Neurologic:       Cardiovascular:  - neg cardiovascular ROS       METS/Exercise Tolerance:     Hematologic:         Musculoskeletal:         GI/Hepatic: Comment: Morning nausea for years.       (-) GERD   Renal/Genitourinary: Comment: endometriosis        Endo:         Psychiatric:     (+) psychiatric history anxiety and depression      Infectious Disease:         Malignancy:         Other:   "  (+) H/O Chronic Pain,                               Lab Results   Component Value Date    WBC 6.6 03/19/2018    HGB 13.3 09/03/2019    HCT 48.3 (H) 03/19/2018     03/19/2018     03/20/2018    POTASSIUM 4.2 03/20/2018    CHLORIDE 105 03/20/2018    CO2 15 (L) 03/20/2018    BUN 8 03/20/2018    CR 0.75 03/20/2018    GLC 67 (L) 03/20/2018    DIONNA 8.8 03/20/2018    ALBUMIN 4.5 03/19/2018    PROTTOTAL 8.6 03/19/2018    ALT 22 03/19/2018    AST 23 03/19/2018    GGT 14 03/16/2018    ALKPHOS 71 03/19/2018    BILITOTAL 0.5 03/19/2018    LIPASE 69 (L) 03/19/2018    TSH 1.92 03/16/2018    HCG Negative 03/15/2018       Preop Vitals  BP Readings from Last 3 Encounters:   09/03/19 119/69   03/21/18 109/77   12/16/14 107/77    Pulse Readings from Last 3 Encounters:   03/21/18 95   11/20/14 81      Resp Readings from Last 3 Encounters:   09/03/19 20   03/21/18 16   11/20/14 16    SpO2 Readings from Last 3 Encounters:   09/03/19 98%   03/17/18 98%   12/16/14 100%      Temp Readings from Last 1 Encounters:   09/03/19 36.6  C (97.9  F) (Temporal)    Ht Readings from Last 1 Encounters:   09/03/19 1.702 m (5' 7\")      Wt Readings from Last 1 Encounters:   09/03/19 70 kg (154 lb 4.8 oz)    Estimated body mass index is 24.17 kg/m  as calculated from the following:    Height as of this encounter: 1.702 m (5' 7\").    Weight as of this encounter: 70 kg (154 lb 4.8 oz).       Anesthesia Plan      History & Physical Review  History and physical reviewed and following examination; no interval change.    ASA Status:  2 .    NPO Status:  > 8 hours    Plan for General, ETT and RSI with Intravenous induction. Maintenance will be Balanced.    PONV prophylaxis:  Ondansetron (or other 5HT-3) and Dexamethasone or Solumedrol  Patient given preop zofran.   Also will give 500 ml fluid bolus.   Morning nausea is common for her. She denies any recent flu like illness.      Postoperative Care  Postoperative pain management:  Multi-modal " analgesia.      Consents  Anesthetic plan, risks, benefits and alternatives discussed with:  Patient.  Use of blood products discussed: No .   .                 Leeroy Deras MD

## 2019-09-03 NOTE — PLAN OF CARE
RN:  Patient POD #0.  A/O x4.  VSS on RA.  Refused capnography.  IV medications for pain control.  States she is not ready to try a diet yet.  Tolerating ice water and ice chips at this time.  X5 lap sites are C/D/I.  Abdomen slightly bloated. No flatus but patient hiccuping.  Able to call and state needs.  IV saline locked.  Voided x2 since arrival to the floor.  Patient hoping to spend the night and discharge in the morning.

## 2019-09-03 NOTE — PROGRESS NOTES
Admission medication history interview status for the 9/3/2019  admission is complete. See EPIC admission navigator for prior to admission medications     Medication history source reliability:Moderate    Medication history interview source(s):Patient    Medication history resources (including written lists, pill bottles, clinic record):None    Primary pharmacy.[facility]    Additional medication history information not noted on PTA med list :    COMPLETED ANTIBIOTIC COURSE COMPLETE 9/2/19: NEOMYCIN 500MG & METRONIDAZOLE 500MG    BROUGHT OWN SUPPLY:  *EPIPEN  *FLOVENT   *ZOFRAN    Time spent in this activity: 45 MINUTES    Prior to Admission medications    Medication Sig Last Dose Taking? Auth Provider   acetaminophen (TYLENOL) 500 MG tablet Take 2,000-2,500 mg by mouth daily as needed for mild pain (500MG X 4 = 2,000MG)  (500MG X 5 = 2,500MG) 9/2/2019 at PRN Yes Reported, Patient   EPINEPHrine (EPIPEN/ADRENACLICK/OR ANY BX GENERIC EQUIV) 0.3 MG/0.3ML injection 2-pack Inject 0.3 mg into the muscle as needed for anaphylaxis ON HAND Yes Reported, Patient   fluticasone (FLONASE) 50 MCG/ACT nasal spray Spray 1 spray into both nostrils daily as needed  8/28/2019 at PRN Yes Reported, Patient   fluticasone (FLOVENT HFA) 110 MCG/ACT inhaler Inhale 2 puffs into the lungs daily as needed MORE THAN A MONTH at PRN Yes Reported, Patient   norgestim-eth estrad triphasic (ORTHO TRI-CYCLEN/TRI-SPRINTEC) 0.18/0.215/0.25 MG-35 MCG tablet Take 1 tablet by mouth daily NOT YET STARTED Yes Reported, Patient   ondansetron (ZOFRAN-ODT) 4 MG disintegrating tablet Take 1-2 tablets (4-8 mg) by mouth every 8 hours as needed for nausea Dissolve ON the tongue.  Patient taking differently: Take 4 mg by mouth 2 times daily as needed for nausea Dissolve ON the tongue. 9/2/2019 at PM Yes Rajeev Temple MD   traZODone (DESYREL) 50 MG tablet Take 1-2 tablets ( mg) by mouth nightly as needed for sleep  Patient taking  differently: Take 50 mg by mouth nightly as needed for sleep  MORE THAN A MONTH at PRN Yes Dana Victoria, DEB   valACYclovir (VALTREX) 500 MG tablet Take 500-1,000 mg by mouth daily as needed (500MG X 2 = 1,000MG) MORE THAN A MONTH at PRN Yes Reported, Patient

## 2019-09-04 VITALS
SYSTOLIC BLOOD PRESSURE: 110 MMHG | HEART RATE: 93 BPM | BODY MASS INDEX: 24.22 KG/M2 | TEMPERATURE: 97.8 F | OXYGEN SATURATION: 98 % | DIASTOLIC BLOOD PRESSURE: 65 MMHG | WEIGHT: 154.3 LBS | HEIGHT: 67 IN | RESPIRATION RATE: 16 BRPM

## 2019-09-04 PROBLEM — Z98.890 POST-OPERATIVE STATE: Status: ACTIVE | Noted: 2019-09-04

## 2019-09-04 LAB
COPATH REPORT: NORMAL
GLUCOSE BLDC GLUCOMTR-MCNC: 84 MG/DL (ref 70–99)

## 2019-09-04 PROCEDURE — G0378 HOSPITAL OBSERVATION PER HR: HCPCS

## 2019-09-04 PROCEDURE — 00000146 ZZHCL STATISTIC GLUCOSE BY METER IP

## 2019-09-04 PROCEDURE — 25000128 H RX IP 250 OP 636: Performed by: OBSTETRICS & GYNECOLOGY

## 2019-09-04 PROCEDURE — 25000132 ZZH RX MED GY IP 250 OP 250 PS 637: Performed by: OBSTETRICS & GYNECOLOGY

## 2019-09-04 PROCEDURE — 25800030 ZZH RX IP 258 OP 636: Performed by: PHYSICIAN ASSISTANT

## 2019-09-04 RX ORDER — LORAZEPAM 1 MG/1
1 TABLET ORAL ONCE
Status: COMPLETED | OUTPATIENT
Start: 2019-09-04 | End: 2019-09-04

## 2019-09-04 RX ADMIN — ACETAMINOPHEN 650 MG: 325 TABLET, FILM COATED ORAL at 02:36

## 2019-09-04 RX ADMIN — IBUPROFEN 600 MG: 600 TABLET ORAL at 01:04

## 2019-09-04 RX ADMIN — OXYCODONE HYDROCHLORIDE 10 MG: 5 TABLET ORAL at 10:30

## 2019-09-04 RX ADMIN — METHOCARBAMOL TABLETS 750 MG: 750 TABLET, COATED ORAL at 02:38

## 2019-09-04 RX ADMIN — OXYCODONE HYDROCHLORIDE 10 MG: 5 TABLET ORAL at 05:47

## 2019-09-04 RX ADMIN — METOCLOPRAMIDE 10 MG: 5 INJECTION, SOLUTION INTRAMUSCULAR; INTRAVENOUS at 05:46

## 2019-09-04 RX ADMIN — OXYCODONE HYDROCHLORIDE 10 MG: 5 TABLET ORAL at 02:36

## 2019-09-04 RX ADMIN — METOCLOPRAMIDE 10 MG: 5 INJECTION, SOLUTION INTRAMUSCULAR; INTRAVENOUS at 00:51

## 2019-09-04 RX ADMIN — LORAZEPAM 1 MG: 1 TABLET ORAL at 11:01

## 2019-09-04 RX ADMIN — SODIUM CHLORIDE, POTASSIUM CHLORIDE, SODIUM LACTATE AND CALCIUM CHLORIDE: 600; 310; 30; 20 INJECTION, SOLUTION INTRAVENOUS at 00:51

## 2019-09-04 NOTE — PROVIDER NOTIFICATION
MD Notification    Notified Person: On call    Notified Person Name: Feli Kramer PA    Notification Date/Time: 9/3/19 7429    Notification Interaction: Paged. Provider called back.    Purpose of Notification: Increasing nausea, no available medications to give.    Orders Received: Yes        
2 = assistive person

## 2019-09-04 NOTE — PLAN OF CARE
A&Ox4. Zofran and Reglan utilized for nausea control. Currently on clear liquids. Continues to complain of severe pain, Oxycodone and Ibuprofen given PRN. VSS. Ambulating stand by. Voiding. Lap sites CDI. Plan to likely discharge home tomorrow.

## 2019-09-04 NOTE — PLAN OF CARE
A&Ox4. VSS on RA. Lap sites CDI. Intermittent nausea controlled w/ zofran and reglan. Marcellus clears. Voiding adequately. Up SBA. IS at bedside. IVF infusing. Pain controlled w/ oxy, tylenol, ibuprofen, and ice. Pt requesting something for anxiety for her 6 hour ride home today. Plan to discharge; Pt's mother will be driving her home. Continue to monitor.

## 2019-09-04 NOTE — PROGRESS NOTES
[facility]  PROGRESS NOTE    September 4, 2019  Post-op Day # 1 s/p ROBOT-ASSISTED, USING DA SHANIKA XI EXCISION RECTAL ENDOMETRIOSIS  LAPAROSCOPY,EXCISION AND ABLATION OF ENDOMETRIOSIS. CYSTOSCOPY, TUBAL DYE STUDY  CYSTOSCOPY DYE STUDY, FALLOPIAN TUBE    SUBJECTIVE:  Pt resting in bed. Endorses diffuse lower abdominal pain. Nausea controlled with Zofran and Reglan, tolerating clear liquid diet. No flatus or BM yet. Ambulating to bathroom only.     OBJECTIVE:  Temp:  [96.5  F (35.8  C)-99.4  F (37.4  C)] 97.8  F (36.6  C)  Pulse:  [52-98] 93  Heart Rate:  [] 60  Resp:  [8-18] 18  BP: (106-122)/(52-80) 109/66  SpO2:  [97 %-100 %] 97 %    Intake/Output Summary (Last 24 hours) at 9/4/2019 0717  Last data filed at 9/4/2019 0200  Gross per 24 hour   Intake 1800 ml   Output 880 ml   Net 920 ml       GENERAL:  Awake, alert, anxious  HEAD: Normocephalic atraumatic  SCLERA: Anicteric  EXTREMITIES: Warm and well perfused  ABDOMEN:  Soft, appropriately tender, non-distended. No guarding, rigidity, or peritoneal signs.   INCISION:  C/d/i, no signs of infection    LABS:  Lab Results   Component Value Date    WBC 6.6 03/19/2018     Lab Results   Component Value Date    HGB 13.3 09/03/2019     Lab Results   Component Value Date    HCT 48.3 03/19/2018     Lab Results   Component Value Date     03/19/2018     Last Basic Metabolic Panel:  Lab Results   Component Value Date     03/20/2018      Lab Results   Component Value Date    POTASSIUM 4.2 03/20/2018     Lab Results   Component Value Date    CHLORIDE 105 03/20/2018     Lab Results   Component Value Date    DIONNA 8.8 03/20/2018     Lab Results   Component Value Date    CO2 15 03/20/2018     Lab Results   Component Value Date    BUN 8 03/20/2018     Lab Results   Component Value Date    CR 0.75 03/20/2018     Lab Results   Component Value Date    GLC 67 03/20/2018       ASSESSMENT/PLAN: 38 year old female POD#1 s/p ROBOT-ASSISTED, USING DA SHANIKA XI  EXCISION RECTAL ENDOMETRIOSIS  LAPAROSCOPY,EXCISION AND ABLATION OF ENDOMETRIOSIS. CYSTOSCOPY, TUBAL DYE STUDY CYSTOSCOPY DYE STUDY, FALLOPIAN TUBE. AVSS, nausea improved, tolerating diet. Discharge likely later today, will defer to OBGYN, follow-up 9/27 with Dr Jewell at 1:30pm.    1. Full liquid diet, advance diet as tolerated. Reglan and zofran for nausea.   2. PRN oxycodone, ibuprofen and tylenol for pain control  3. SLIV  4. OOB, ambulate QID. No heavy lifting or pushing greater than 20 pounds for 6 weeks.   5. Lovenox for ppx  6. Follow-up in clinic with Dr Jewell in [place] on 9/27/19 at 1:30pm.   7. Discharge likely today, defer to OBGYN    This plan will be discussed with Dr Jewell.     For questions/paging, please contact the CRS office at [phone].    Feli Kramer PA-C  [facility]  Phone: [phone]    CRS Staff.  Seen and examined independently.  Agree with above.    Abdomen soft and benign.  Complains of pain, but she says this is expected.  Reviewed operative findings.  OK to discharge today.    I performed a history and physical examination of the patient and discussed their management with the physician assistant. I reviewed the physician assistants note and agree with the documented findings and plan of care.     Maged Jewell MD  [facility]  [phone] (office)  [phone] (pager)  [email or web address]    Condition on discharge from OR: Satisfactory    Feli Kramer PA-C   [facility]   [phone].        ADDENDUM:    PATIENT DATA  Indicate Y or N:  Home O2 No  Hemodialysis  No  Transplant patient  No  Cirrhosis  No  Steroids in last 30 days  No  Immunomodulators in last 30 days  No  Anticoagulation at time of surgery  NO  Prior abdominal surgery  Yes  Pelvic irradiation  No    Albumin within 30 days if known Unknown   Hgb within 30 days if known    Hemoglobin   Date Value Ref Range Status   09/03/2019 13.3 11.7  - 15.7 g/dL Final   ]  Cr within 30 days if known    Creatinine   Date Value Ref Range Status   03/20/2018 0.75 0.52 - 1.04 mg/dL Final   ]  Body mass index is 24.17 kg/m .      OR DATA  Emergent  No   <24 hours  No   <1 week  No  Bowel Prep Yes  Antibiotics  Yes  DVT prophylaxis    Heparin  Yes   SCD  No   None  No  Drain  No  ASA (1,2,3,4) 2  OR time (min) 60  Stents  No  Transfuse >/= 2U  No  Anastomosis   Stapled  No   Handsewn  No  Leak Test    Positive  No   Negative  No   Not done  Yes

## 2019-09-04 NOTE — PROGRESS NOTES
Called and spoke with Dr. Yobani Maria regarding patient's discharge.  Per conversation, Dr. Maria does not plan on coming in to see the patient.  He was not expecting the patient  to spend the night.  Writer of this note was able to obtain a verbal one time order of oral Ativan that the patient may have prior to the ride home. Dr. Maria will connect with her home discharge pharmacy with the birth control prescription and have it filled there for pick-up.  Writer of this note will update the patient regarding phone conversation with Dr. Maria.

## 2019-09-04 NOTE — OP NOTE
Procedure Date: 09/03/2019      PREOPERATIVE DIAGNOSIS:  Pelvic pain.      POSTOPERATIVE DIAGNOSES:   1.  Pelvic pain.   2.  Possible pelvic endometriosis.      PROCEDURES:  Robotic-assisted lysis of adhesions and excision of possible pelvic endometriosis.      STAFF SURGEON:  Maged Jewell MD      CO-SURGEON:  Ranulfo Maria MD      FIRST ASSISTANT:  Feli Kramer PA-C      SECOND ASSISTANT:  Mary Toussaint PA-C      ANESTHESIA:  General.      ESTIMATED BLOOD LOSS:  5 mL.      SPECIMEN:  Left iliac fossa, suspected endometriosis.      FINDINGS:  There was really minimal adhesions of the bowel wall.  There was absolutely no adhesions of the cecum and the sigmoid down into the cul-de-sac, and these structures freely came up out of the cul-de-sac.  I did examine the entire intraperitoneal rectum and sigmoid colon, and there were no lesions noted.  There was a lesion along the peritoneum lateral to the colon in the left iliac fossa.  This portion of the peritoneum was excised, ensuring no injury to the underlying structures.  The remainder of the case was performed by Dr. Maria.      INDICATIONS:  Lorena is a 38-year-old female with history of chronic pelvic pain and endometriosis.  She has been evaluated by Dr. Rajeev Temple, as well as Dr. Ranulfo Maria.  She had 2 prior operative laparoscopies, and endometriosis involving the bowel was seen at her last procedure. She did undergo a colonoscopy preoperatively which was unremarkable.  She is presenting today for a diagnostic laparoscopy with possible excision of pelvic endometriosis, as well as a tubal dye study.  Our assistance was requested, given the possibility of involvement of the bowel.  I met with the patient in consultation in the office.  I discussed the risks of surgery, including the risk of bleeding, infection and need for possible stoma should a bowel excision occur.  She wishes to proceed.      DESCRIPTION OF PROCEDURE:  After obtaining informed  consent, the patient was brought to the operating room, general anesthesia was induced, the patient was intubated by the Anesthesia Service without difficulty.  She was placed in low modified lithotomy position.  All pressure points were inspected and padded appropriately.  The abdomen was shaved, prepped and draped in the usual sterile fashion.  A timeout was undertaken, which identified the patient and correct procedure.      Dr. Maria began by placing a Hsieh catheter under sterile conditions, and a Graves speculum was placed to visualize the cervix.  As he was doing this, I made a stab incision in the left upper quadrant.  A Veress needle was used to establish pneumoperitoneum.  He then scrubbed back into the abdominal field, and we gently placed trocars to allow for docking of the [place] robot.  We placed an 8 mm infraumbilical trocar as well as 1 trocar in the left lower quadrant and another 8 mm trocar in the right lower quadrant, as well as a right-sided upper quadrant 8 mm AirSeal trocar.  The patient was placed in steep Trendelenburg position, and the abdomen was inspected with use of a 30-degree, 8 mm robotic laparoscope.  I should note that the trocars were placed under direct visualization.  The small bowel came out of the pelvis nicely with steep Trendelenburg positioning.  We utilized the laparoscopic grasper to pull the cecum up out of the pelvis.  This appeared normal.  There was no evidence of any endometriosis involving the colon and neither the cecum or the sigmoid colon.  We then docked the robot over the patient's left side with the camera in the infraumbilical position, fenestrated bipolar in our left hand, and a monopolar scissors in our right hand utilizing a #3 arm technique.  With this instrumentation, I then went to the console and inspected things a little more closely.  The uterus was retracted cephalad.  The intraperitoneal rectum was identified and found to be entirely free of  any endometriosis.  The sigmoid colon was identified and run up to its attachments to the descending colon, and this was again totally normal.  There was one area of scarring of white fibrotic tissue of the peritoneum lateral to the colon in the left iliac fossa.  I was able to easily elevate this off the retroperitoneal structures and utilizing cautery, gently excised this off the retroperitoneum without any injury to the underlying structures.  This was then grasped with a grasper via the assistant port and removed.  There was an additional spot of scarring in the left lateral anterior abdominal wall, which was simply cauterized for fulguration with the monopolar scissors.  At this point, after ensuring hemostasis, I turned the case over to Dr. Maria.  Please see his operative note for full details.         NIKI ROBLERO MD             D: 2019   T: 2019   MT: DEEJAY      Name:     GAUTAM MORIN   MRN:      [id number]        Account:        [id number]   :      1980           Procedure Date: 2019      Document: [id number]       cc: [facility]        Zenon Kennedy MD

## 2019-09-12 NOTE — DISCHARGE SUMMARY
[facility] Discharge Summary      Lorena Holden MRN# [id number]   Age: 38 year old YOB: 1980     Date of Admission:  9/3/2019  Date of Discharge::  9/4/2019 11:07 AM  Admitting Physician:  Maged Jewell MD  Discharge Physician:  Maged Jewell MD     PCP:  Zenon Kennedy    Disposition: Patient discharged from [facility] to home in stable condition.        Primary Diagnosis:   Endometriosis            Discharge Medications:     Discharge Medication List as of 9/4/2019 10:49 AM      START taking these medications    Details   celecoxib (CELEBREX) 200 MG capsule Take 1 capsule (200 mg) by mouth 2 times daily, Disp-30 capsule, R-0, E-Prescribe      docusate sodium (COLACE) 100 MG capsule Take 1 capsule (100 mg) by mouth 2 times daily, Disp-60 capsule, R-0, E-Prescribe      !! ondansetron (ZOFRAN-ODT) 4 MG ODT tab Take 1-2 tablets (4-8 mg) by mouth every 8 hours as needed for nausea, Disp-4 tablet, R-0, E-Prescribe      oxyCODONE (ROXICODONE) 5 MG tablet Take 1-2 tablets (5-10 mg) by mouth every 4 hours as needed for moderate to severe pain, Disp-30 tablet, R-0, Local Print       !! - Potential duplicate medications found. Please discuss with provider.      CONTINUE these medications which have NOT CHANGED    Details   acetaminophen (TYLENOL) 500 MG tablet Take 2,000-2,500 mg by mouth daily as needed for mild pain (500MG X 4 = 2,000MG)  (500MG X 5 = 2,500MG), Historical      EPINEPHrine (EPIPEN/ADRENACLICK/OR ANY BX GENERIC EQUIV) 0.3 MG/0.3ML injection 2-pack Inject 0.3 mg into the muscle as needed for anaphylaxis, Historical      fluticasone (FLONASE) 50 MCG/ACT nasal spray Spray 1 spray into both nostrils daily as needed , Historical      fluticasone (FLOVENT HFA) 110 MCG/ACT inhaler Inhale 2 puffs into the lungs daily as needed, Historical      norgestim-eth estrad triphasic (ORTHO TRI-CYCLEN/TRI-SPRINTEC) 0.18/0.215/0.25 MG-35 MCG tablet Take 1 tablet by mouth daily,  Historical      !! ondansetron (ZOFRAN-ODT) 4 MG disintegrating tablet Take 1-2 tablets (4-8 mg) by mouth every 8 hours as needed for nausea Dissolve ON the tongue., Disp-4 tablet, R-0, E-Prescribe      traZODone (DESYREL) 50 MG tablet Take 1-2 tablets ( mg) by mouth nightly as needed for sleep, Disp-60 tablet, R-0, E-Prescribe      valACYclovir (VALTREX) 500 MG tablet Take 500-1,000 mg by mouth daily as needed (500MG X 2 = 1,000MG), Historical       !! - Potential duplicate medications found. Please discuss with provider.                 Follow Up, Special Instructions:     Discharge diet: Regular   Discharge activity: Lifting restricted to 10 pounds   Discharge follow-up: Follow up with Dr. Jewell as needed   Wound care: May get incision wet in shower but do not soak or scrub              Procedures:     Procedure(s): Robotic assisted excision of rectal endometriosis       Excision and ablation of endometriosis, Tubal dye study, cystoscopy by Dr. Maria            Consultations:   none          Brief Hospital Summary:     Patient is a 38 year old female who underwent endometrial excision on 9/3/19 by Dr. Jewell and Dr. Maria.   There were no immediate complications during this procedure.    Please refer to the full operative summary for details.  The patient's hospital course was unremarkable.  Pain was controlled on oral pain regimen.  She was tolerating a low fiber diet.  Bowel function had returned prior to discharge.  She recovered as anticipated and experienced no post-operative complications.         Attestation:  I have reviewed today's vital signs, notes, medications, labs and imaging.    Padmini Sanchez PA-C  [facility]          ADDENDUM:  Length of stay: 1 days  Indicate Y or N for the following:  UTI  No  C diff  No  PNA  No  SSI No  DVT No  PE  No  CVA No  MI No  Enterocutaneous fistula  No  Peripheral nerve injury  No  Abscess (not adjacent to anastomosis)  No  Leak  No    Treated with:   Antibiotics NO   Drain  NO   Reoperation  NO  Death within 30 days No  Reintubation  No  Reoperation  No   Procedure n/a
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
## 2020-01-24 ENCOUNTER — HOSPITAL PATHOLOGY (OUTPATIENT)
Dept: OTHER | Facility: CLINIC | Age: 40
End: 2020-01-24

## 2020-01-27 LAB — COPATH REPORT: NORMAL

## 2021-12-08 NOTE — IP AVS SNAPSHOT
MRN:[id number]                      After Visit Summary   3/15/2018    Lorena Holden    MRN: [id number]           Thank you!     Thank you for choosing [facility] for your care. Our goal is always to provide you with excellent care.        Patient Information     Date Of Birth          1980        Designated Caregiver       Most Recent Value    Caregiver    Will someone help with your care after discharge? yes    Name of designated caregiver John Sarmiento    Phone number of caregiver pt unsure    Caregiver address [address] [place]      About your hospital stay     You were admitted on:  March 15, 2018 You last received care in the:  [facility]    You were discharged on:  March 22, 2018       Who to Call     For medical emergencies, please call 911.  For non-urgent questions about your medical care, please call your primary care provider or clinic, [phone]          Attending Provider     Provider Specialty    Feliciano Moore MD Psychiatry       Primary Care Provider Office Phone # Fax #    Zenon C Kennedy [phone]      Further instructions from your care team        Behavioral Discharge Planning and Instructions    [facility]    Summary:  You were admitted on 3/15/2018 due to suicidal ideation.  You were treated by Dr. Feliciano Moore MD and discharged on 3/22/18 from [facility].      Principal Diagnosis: Major Depressive Disorder      Health Care Follow-up Appointments:   Please Make follow up appointment with your providers as you get closer to discharging from [facility].    Attend all scheduled appointments with your outpatient providers. Call at least 24 hours in advance if you need to reschedule an appointment to ensure continued access to your outpatient providers.   Major Treatments, Procedures and Findings:  You were provided with: a psychiatric assessment, assessed for medical stability and medication  evaluation and/or management    Symptoms to Report: feeling more aggressive, increased confusion, losing more sleep, mood getting worse or thoughts of suicide    Early warning signs can include: increased depression or anxiety sleep disturbances increased thoughts or behaviors of suicide or self-harm  increased unusual thinking, such as paranoia or hearing voices    Safety and Wellness:  Take all medicines as directed.  Make no changes unless your doctor suggests them.      Follow treatment recommendations.  Refrain from alcohol and non-prescribed drugs.  If there is a concern for safety, call 911.    Resources:   Crisis Intervention: 498.491.4613 or 169-695-7632 (TTY: 572.186.1387).  Call anytime for help.  [employer] [place] [employer] Mental Illness (www.[place].araceli.org): 234.526.2440 or 840-111-8490.  Alcoholics Anonymous (www.alcoholics-anonymous.org): Check your phone book for your local chapter.  [facility] Crisis (COPE) Response - Adult [phone]    The treatment team has appreciated the opportunity to work with you.     If you have any questions or concerns our unit number is [phone].  You may be receiving a follow-up phone call within the next three days from a representative from behavioral health.          Pending Results     No orders found from 3/13/2018 to 3/16/2018.            Statement of Approval     Ordered          03/22/18 1054  I have reviewed and agree with all the recommendations and orders detailed in this document.  EFFECTIVE NOW     Approved and electronically signed by:  Feliciano Moore MD             Admission Information     Date & Time Provider Department Dept. Phone    3/15/2018 Feliciano Moore MD  [facility] [phone]      Your Vitals Were     Blood Pressure Pulse Temperature Respirations Weight Last Period    109/77 95 97.4  F (36.3  C) (Oral) 16 68.5 kg (151 lb) 03/10/2018 (Exact Date)    Pulse Oximetry BMI (Body Mass Index)                98% 23.65 kg/m2         "  MyChart Information     [facility] lets you send messages to your doctor, view your test results, renew your prescriptions, schedule appointments and more. To sign up, go to [email or web address] . Click on \"Log in\" on the left side of the screen, which will take you to the Welcome page. Then click on \"Sign up Now\" on the right side of the page.     You will be asked to enter the access code listed below, as well as some personal information. Please follow the directions to create your username and password.     Your access code is: [email or web address]  Expires: 2018 11:18 AM     Your access code will  in 90 days. If you need help or a new code, please call your [facility] clinic or [phone].        Care EveryWhere ID     This is your Care EveryWhere ID. This could be used by other organizations to access your [facility] medical records  [id number]        Equal Access to Services     ADRIÁN MITCHELL AH: Karolina herrerao Soruperto, waaxda lularissa, qaybta kaalmada adekate, riaz felix . So [facility] [phone].    ATENCIÓN: Si habla español, tiene a daugherty disposición servicios gratuitos de asistencia lingüística. Llame al [phone].    We comply with applicable federal civil rights laws and [place] laws. We do not discriminate on the basis of race, color, national origin, age, disability, sex, sexual orientation, or gender identity.               Review of your medicines      START taking        Dose / Directions    bisacodyl 10 MG Suppository   Commonly known as:  DULCOLAX   Used for:  Constipation, unspecified constipation type        Dose:  10 mg   Place 1 suppository (10 mg) rectally daily as needed for constipation   Quantity:  30 suppository   Refills:  1       famotidine 10 MG tablet   Commonly known as:  PEPCID   Used for:  Gastroesophageal reflux disease, esophagitis presence not specified        Dose:  10 mg   Take 1 tablet (10 mg) by mouth 2 times daily   Quantity:  60 " tablet   Refills:  1       hydrOXYzine 25 MG tablet   Commonly known as:  ATARAX   Used for:  Anxiety        Dose:  25 mg   Take 1 tablet (25 mg) by mouth every 4 hours as needed for anxiety   Quantity:  120 tablet   Refills:  1       ibuprofen 400 MG tablet   Commonly known as:  ADVIL/MOTRIN   Used for:  Other chronic pain        Dose:  400 mg   Take 1 tablet (400 mg) by mouth 3 times daily as needed for moderate pain   Quantity:  60 tablet   Refills:  1       magnesium hydroxide 400 MG/5ML suspension   Commonly known as:  MILK OF MAGNESIA   Used for:  Constipation, unspecified constipation type        Dose:  30 mL   Take 30 mLs by mouth nightly as needed for constipation   Quantity:  105 mL   Refills:  1       multivitamin, therapeutic with minerals Tabs tablet   Used for:  Vitamin deficiency        Dose:  1 tablet   Start taking on:  3/23/2018   Take 1 tablet by mouth daily   Quantity:  30 each   Refills:  0       * polyethylene glycol Packet   Commonly known as:  MIRALAX/GLYCOLAX   Used for:  Constipation, unspecified constipation type        Dose:  17 g   Take 17 g by mouth daily as needed for constipation   Quantity:  7 packet   Refills:  1       * polyethylene glycol Packet   Commonly known as:  MIRALAX/GLYCOLAX   Used for:  Constipation, unspecified constipation type        Dose:  17 g   Take 17 g by mouth 2 times daily   Quantity:  60 packet   Refills:  1       propranolol 10 MG tablet   Commonly known as:  INDERAL   Used for:  Anxiety        Dose:  10 mg   Take 1 tablet (10 mg) by mouth 3 times daily as needed (anxiety)   Quantity:  90 tablet   Refills:  1       * Notice:  This list has 2 medication(s) that are the same as other medications prescribed for you. Read the directions carefully, and ask your doctor or other care provider to review them with you.      CONTINUE these medicines which have NOT CHANGED        Dose / Directions    busPIRone 15 MG tablet   Commonly known as:  BUSPAR   Used for:   Anxiety        Dose:  15 mg   Take 1 tablet (15 mg) by mouth 3 times daily   Quantity:  90 tablet   Refills:  1       DULoxetine 60 MG EC capsule   Commonly known as:  CYMBALTA   Used for:  PTSD (post-traumatic stress disorder)        Dose:  60 mg   Take 1 capsule (60 mg) by mouth daily   Quantity:  30 capsule   Refills:  0       fluticasone 50 MCG/ACT spray   Commonly known as:  FLONASE        Dose:  2 spray   Spray 2 sprays into both nostrils daily   Refills:  0       metroNIDAZOLE 500 MG tablet   Commonly known as:  FLAGYL   Indication:  Bacterial Vaginosis   Used for:  Bacterial vaginosis        Dose:  500 mg   Take 1 tablet (500 mg) by mouth 2 times daily for 2 days   Quantity:  3 tablet   Refills:  0       naltrexone 50 MG tablet   Commonly known as:  DEPADE;REVIA   Used for:  PTSD (post-traumatic stress disorder)        Dose:  50 mg   Take 1 tablet (50 mg) by mouth daily   Quantity:  30 tablet   Refills:  0       ondansetron 4 MG ODT tab   Commonly known as:  ZOFRAN-ODT   Used for:  Nausea & vomiting        Dose:  4-8 mg   Take 1-2 tablets (4-8 mg) by mouth every 8 hours as needed for nausea Dissolve ON the tongue.   Quantity:  4 tablet   Refills:  0       traZODone 50 MG tablet   Commonly known as:  DESYREL   Used for:  PTSD (post-traumatic stress disorder)        Dose:   mg   Take 1-2 tablets ( mg) by mouth nightly as needed for sleep   Quantity:  60 tablet   Refills:  0            Where to get your medicines      These medications were sent to [facility] [place], [place] - [address]  [address] [address]     Phone:  [phone]     bisacodyl 10 MG Suppository    busPIRone 15 MG tablet    famotidine 10 MG tablet    hydrOXYzine 25 MG tablet    ibuprofen 400 MG tablet    magnesium hydroxide 400 MG/5ML suspension    metroNIDAZOLE 500 MG tablet    multivitamin, therapeutic with minerals Tabs tablet    polyethylene glycol Packet    polyethylene glycol  Packet    propranolol 10 MG tablet                Protect others around you: Learn how to safely use, store and throw away your medicines at www.disposemymeds.org.        ANTIBIOTIC INSTRUCTION     You've Been Prescribed an Antibiotic - Now What?  Your healthcare team thinks that you or your loved one might have an infection. Some infections can be treated with antibiotics, which are powerful, life-saving drugs. Like all medications, antibiotics have side effects and should only be used when necessary. There are some important things you should know about your antibiotic treatment.      Your healthcare team may run tests before you start taking an antibiotic.    Your team may take samples (e.g., from your blood, urine or other areas) to run tests to look for bacteria. These test can be important to determine if you need an antibiotic at all and, if you do, which antibiotic will work best.      Within a few days, your healthcare team might change or even stop your antibiotic.    Your team may start you on an antibiotic while they are working to find out what is making you sick.    Your team might change your antibiotic because test results show that a different antibiotic would be better to treat your infection.    In some cases, once your team has more information, they learn that you do not need an antibiotic at all. They may find out that you don't have an infection, or that the antibiotic you're taking won't work against your infection. For example, an infection caused by a virus can't be treated with antibiotics. Staying on an antibiotic when you don't need it is more likely to be harmful than helpful.      You may experience side effects from your antibiotic.    Like all medications, antibiotics have side effects. Some of these can be serious.    Let you healthcare team know if you have any known allergies when you are admitted to the hospital.    One significant side effect of nearly all antibiotics is the  risk of severe and sometimes deadly diarrhea caused by Clostridium difficile (C. Difficile). This occurs when a person takes antibiotics because some good germs are destroyed. Antibiotic use allows C. diificile to take over, putting patients at high risk for this serious infection.    As a patient or caregiver, it is important to understand your or your loved one's antibiotic treatment. It is especially important for caregivers to speak up when patients can't speak for themselves. Here are some important questions to ask your healthcare team.    What infection is this antibiotic treating and how do you know I have that infection?    What side effects might occur from this antibiotic?    How long will I need to take this antibiotic?    Is it safe to take this antibiotic with other medications or supplements (e.g., vitamins) that I am taking?     Are there any special directions I need to know about taking this antibiotic? For example, should I take it with food?    How will I be monitored to know whether my infection is responding to the antibiotic?    What tests may help to make sure the right antibiotic is prescribed for me?      Information provided by:  www.cdc.gov/getsmart  U.S. Department of Health and Human Services  Centers for disease Control and Prevention  National Center for Emerging and Zoonotic Infectious Diseases  Division of Healthcare Quality Promotion             Medication List: This is a list of all your medications and when to take them. Check marks below indicate your daily home schedule. Keep this list as a reference.      Medications           Morning Afternoon Evening Bedtime As Needed    bisacodyl 10 MG Suppository   Commonly known as:  DULCOLAX   Place 1 suppository (10 mg) rectally daily as needed for constipation   Last time this was given:  10 mg on 3/22/2018  9:00 AM                                busPIRone 15 MG tablet   Commonly known as:  BUSPAR   Take 1 tablet (15 mg) by mouth 3  times daily   Last time this was given:  15 mg on 3/22/2018  9:01 AM                                   DULoxetine 60 MG EC capsule   Commonly known as:  CYMBALTA   Take 1 capsule (60 mg) by mouth daily   Last time this was given:  60 mg on 3/22/2018  9:01 AM                                   famotidine 10 MG tablet   Commonly known as:  PEPCID   Take 1 tablet (10 mg) by mouth 2 times daily   Last time this was given:  10 mg on 3/22/2018  9:01 AM                                      fluticasone 50 MCG/ACT spray   Commonly known as:  FLONASE   Spray 2 sprays into both nostrils daily   Last time this was given:  2 sprays on 3/16/2018 12:57 PM                                hydrOXYzine 25 MG tablet   Commonly known as:  ATARAX   Take 1 tablet (25 mg) by mouth every 4 hours as needed for anxiety   Last time this was given:  25 mg on 3/15/2018 11:08 PM                                   ibuprofen 400 MG tablet   Commonly known as:  ADVIL/MOTRIN   Take 1 tablet (400 mg) by mouth 3 times daily as needed for moderate pain                                   magnesium hydroxide 400 MG/5ML suspension   Commonly known as:  MILK OF MAGNESIA   Take 30 mLs by mouth nightly as needed for constipation   Last time this was given:  30 mLs on 3/16/2018 12:57 PM                                metroNIDAZOLE 500 MG tablet   Commonly known as:  FLAGYL   Take 1 tablet (500 mg) by mouth 2 times daily for 2 days   Last time this was given:  500 mg on 3/22/2018  9:01 AM                                      multivitamin, therapeutic with minerals Tabs tablet   Take 1 tablet by mouth daily   Start taking on:  3/23/2018   Last time this was given:  1 tablet on 3/22/2018  9:01 AM                                   naltrexone 50 MG tablet   Commonly known as:  DEPADE;REVIA   Take 1 tablet (50 mg) by mouth daily   Last time this was given:  50 mg on 3/22/2018  9:01 AM                                   ondansetron 4 MG ODT tab   Commonly known as:   ZOFRAN-ODT   Take 1-2 tablets (4-8 mg) by mouth every 8 hours as needed for nausea Dissolve ON the tongue.   Last time this was given:  8 mg on 3/22/2018  9:04 AM                                   * polyethylene glycol Packet   Commonly known as:  MIRALAX/GLYCOLAX   Take 17 g by mouth daily as needed for constipation   Last time this was given:  17 g on 3/22/2018  9:59 AM                                   * polyethylene glycol Packet   Commonly known as:  MIRALAX/GLYCOLAX   Take 17 g by mouth 2 times daily   Last time this was given:  17 g on 3/22/2018  9:59 AM                                propranolol 10 MG tablet   Commonly known as:  INDERAL   Take 1 tablet (10 mg) by mouth 3 times daily as needed (anxiety)                                   traZODone 50 MG tablet   Commonly known as:  DESYREL   Take 1-2 tablets ( mg) by mouth nightly as needed for sleep   Last time this was given:  50 mg on 3/21/2018  8:40 PM                                * Notice:  This list has 2 medication(s) that are the same as other medications prescribed for you. Read the directions carefully, and ask your doctor or other care provider to review them with you.       [Normal] : oriented to person, place and time, the affect was normal, the mood was normal and not anxious [de-identified] : somewhat dry oral cavity. partially edentulous

## 2022-03-07 NOTE — PROGRESS NOTES
Pt requested, and received, 50 mg trazodone prn for sleep, as well as prn Zofran for nausea. Pt reports that her nausea is improving.    1 pair

## 2023-03-23 NOTE — PLAN OF CARE
Problem: Discharge Planning  Goal: Discharge Planning (Adult, OB, Behavioral, Peds)  Pt checked in with pt and found her sitting on her bed crying.  When writer asked her what was wrong pt stated that she is still having the same nightmares and they are not going away.  Writer discussed the fact that therapy would be beneficial for alleviating her PTSD symptoms.  Writer advised her nurse to see if there was a PRN that the pt could take to help with her anxiety.        Routed to Dr. Powell for further review/recommendations re: [facility] w/ [facility]. Pt called dank santamaria was told to contact office to correct.

## 2024-09-18 NOTE — PLAN OF CARE
RN:  Patient discharged to home POD #1.  A/O x4.  Pain controlled at this time with oral pain medications.  Tolerated advance to regular diet.  Up IND and voiding adequeately.  X5 incision sites are C/D/I.  Discharge AVS instructions and medications reviewed with the patient prior to discharge.  Questions and concerns addressed.  Discharge medications sent with the patient.  Oral ativan given x1 as instructed prior to the ride home.    Stable on omeprazole 40mg   Has been on omeprazole for ~5 years, but states not always taking daily   Will refer to GI to determine if EGD is needed for further eval

## 2024-10-07 NOTE — PLAN OF CARE
"Problem: General Plan of Care (Inpatient Behavioral)  Goal: Individualization/Patient Specific Goal (IP Behavioral)  The patient and/or their representative will achieve their patient-specific goals related to the plan of care.    The patient-specific goals include:   Patient will verbalize a decrease in suicidal ideation prior to discharge.   Patient will contract for safety if having thoughts of self harm.   Patient will verbalize an acceptable level of pain while on unit.   Patient will have a safe withdrawal from alcohol.   Patient will comply with treatment team recommendations while on unit.   Pt tearful, rocking on bed and reports anxiety is 10/10 at this time. Pt states she was sleeping when an incident occurred on the unit and this triggered her PTSD. Pt moved to the [facility] unit with the rest of pts. Pt reports feeling frightened and concerned for her safety. Reiterated to pt that she is safe here. Pt also states that pain is 8/10 in abdomen/colon area at this time. Pt reports suicidal thoughts are still there but is not suicidal at this time. She contracts for safety. Assessed pt on MSSA at 1600 and scored a 10. She received 10mg Valium per protocol at 1618. Also administered Skelaxin at this time for pain. Pt has since been resting in bed.   2000-Pt scored a 5 on MSSA. Did not receive Valium at this time per protocol. Pt reports pain is a \"little\" better at this time.  2213-Administered Zyprexa 10mg po for agitation and Ibuprofen for 8/10 pain.  2300- Pt came to nursing window notified staff that Lorena had become dizzy and started lowering towards the floor. The pt walking beside her assisted Lorena to the floor. Pt noted to be lying on the floor. Upon assessing pt she appeared pale, diaphoretic and reports feeling dizzy. Pt reports that she did not hit head and does not physically feel hurt anywhere. Vitals at that time were 158/101, 93, 20, 96.1 and 97% on RA. Assisted x 2 into wheel chair. Pt given snack " Blood sugar before supper was 89, noted it is not transferred into epic.   and fluids. Rechecked BP at 2315- 72/60. 2330 - BP 92/68. 2345-102/68. Notified NP Dana VILLEGAS She said to continue to monitor BP through night .Pt given tap bell and also has wheelchair. Pt instructed on use of tap bell and pt verbalizes understanding.

## (undated) DEVICE — PAD CHUX UNDERPAD 23X24" 7136

## (undated) DEVICE — SUCTION CANISTER MEDIVAC LINER 3000ML W/LID 65651-530

## (undated) DEVICE — DAVINCI XI DRAPE ARM 470015

## (undated) DEVICE — TUBING CONMED AIRSEAL SMOKE EVAC INSUFFLATION ASM-EVAC

## (undated) DEVICE — GLOVE PROTEXIS W/NEU-THERA 7.0  2D73TE70

## (undated) DEVICE — DAVINCI XI OBTURATOR BLADELESS 8MM 470359

## (undated) DEVICE — DAVINCI HOT SHEARS TIP COVER  400180

## (undated) DEVICE — ADH SKIN CLOSURE PREMIERPRO EXOFIN MICOR HV 0.5ML 3471

## (undated) DEVICE — GLOVE PROTEXIS MICRO 6.5  2D73PM65

## (undated) DEVICE — TUBING IRRIG CYSTO/BLADDER SET 81" LF 2C4040

## (undated) DEVICE — KIT PATIENT POSITIONING PIGAZZI LATEX FREE 40580

## (undated) DEVICE — LINEN TOWEL PACK X5 5464

## (undated) DEVICE — PANTIES MESH LG/XLG 2PK 706M2

## (undated) DEVICE — GLOVE PROTEXIS BLUE W/NEU-THERA 7.5  2D73EB75

## (undated) DEVICE — DAVINCI XI DRAPE COLUMN 470341

## (undated) DEVICE — DRSG TELFA 3X8" 1238

## (undated) DEVICE — NDL INSUFFLATION 13GA 120MM C2201

## (undated) DEVICE — DAVINCI XI SEAL UNIVERSAL 5-8MM 470361

## (undated) DEVICE — TAPE CLOTH ADHESIVE 3" LATEX FREE

## (undated) DEVICE — SOL WATER IRRIG 3000ML BAG 2B7117

## (undated) DEVICE — GOWN IMPERVIOUS SPECIALTY XL/XLONG 39049

## (undated) DEVICE — GLOVE PROTEXIS BLUE W/NEU-THERA 6.5  2D73EB65

## (undated) DEVICE — SOL NACL 0.9% INJ 1000ML BAG 2B1324X

## (undated) DEVICE — CATH TRAY FOLEY SURESTEP 16FR WDRAIN BAG STLK LATEX A300316A

## (undated) DEVICE — GLOVE PROTEXIS W/NEU-THERA 7.5  2D73TE75

## (undated) DEVICE — DAVINCI XI REDUCER 8-12MM 470381

## (undated) DEVICE — SU VICRYL 4-0 PS-2 18" UND J496H

## (undated) DEVICE — DAVINCI XI FCP BIPOLAR FENESTRATED 470205

## (undated) DEVICE — DAVINCI XI MONOPOLAR SCISSORS HOT SHEARS 8MM 470179

## (undated) DEVICE — SOL WATER IRRIG 1000ML BOTTLE 2F7114

## (undated) DEVICE — SUCTION IRR STRYKERFLOW II W/TIP 250-070-520

## (undated) DEVICE — SOL NACL 0.9% IRRIG 1000ML BOTTLE 2F7124

## (undated) DEVICE — ENDO TROCAR CONMED AIRSEAL BLADELESS 08X120MM IAS8-120LP

## (undated) DEVICE — DAVINCI XI ESU FCP BIPOLAR MARYLAND 470172

## (undated) DEVICE — PACK DAVINCI UROLOGY SBA15UDFSG

## (undated) DEVICE — ESU GROUND PAD UNIVERSAL W/O CORD

## (undated) DEVICE — GLOVE PROTEXIS BLUE W/NEU-THERA 7.0  2D73EB70

## (undated) RX ORDER — LIDOCAINE HYDROCHLORIDE 10 MG/ML
INJECTION, SOLUTION EPIDURAL; INFILTRATION; INTRACAUDAL; PERINEURAL
Status: DISPENSED
Start: 2019-09-03

## (undated) RX ORDER — HEPARIN SODIUM 5000 [USP'U]/.5ML
INJECTION, SOLUTION INTRAVENOUS; SUBCUTANEOUS
Status: DISPENSED
Start: 2019-09-03

## (undated) RX ORDER — VECURONIUM BROMIDE 1 MG/ML
INJECTION, POWDER, LYOPHILIZED, FOR SOLUTION INTRAVENOUS
Status: DISPENSED
Start: 2019-09-03

## (undated) RX ORDER — CIPROFLOXACIN 2 MG/ML
INJECTION, SOLUTION INTRAVENOUS
Status: DISPENSED
Start: 2019-09-03

## (undated) RX ORDER — KETOROLAC TROMETHAMINE 30 MG/ML
INJECTION, SOLUTION INTRAMUSCULAR; INTRAVENOUS
Status: DISPENSED
Start: 2019-09-03

## (undated) RX ORDER — BUPIVACAINE HYDROCHLORIDE AND EPINEPHRINE 2.5; 5 MG/ML; UG/ML
INJECTION, SOLUTION EPIDURAL; INFILTRATION; INTRACAUDAL; PERINEURAL
Status: DISPENSED
Start: 2019-09-03

## (undated) RX ORDER — ONDANSETRON 4 MG/1
TABLET, ORALLY DISINTEGRATING ORAL
Status: DISPENSED
Start: 2019-09-03

## (undated) RX ORDER — ALVIMOPAN 12 MG/1
CAPSULE ORAL
Status: DISPENSED
Start: 2019-09-03

## (undated) RX ORDER — ACETAMINOPHEN 325 MG/1
TABLET ORAL
Status: DISPENSED
Start: 2019-09-03

## (undated) RX ORDER — HYDROMORPHONE HYDROCHLORIDE 1 MG/ML
INJECTION, SOLUTION INTRAMUSCULAR; INTRAVENOUS; SUBCUTANEOUS
Status: DISPENSED
Start: 2019-09-03

## (undated) RX ORDER — LIDOCAINE HYDROCHLORIDE 20 MG/ML
INJECTION, SOLUTION EPIDURAL; INFILTRATION; INTRACAUDAL; PERINEURAL
Status: DISPENSED
Start: 2019-09-03

## (undated) RX ORDER — NEOSTIGMINE METHYLSULFATE 1 MG/ML
VIAL (ML) INJECTION
Status: DISPENSED
Start: 2019-09-03

## (undated) RX ORDER — ONDANSETRON 2 MG/ML
INJECTION INTRAMUSCULAR; INTRAVENOUS
Status: DISPENSED
Start: 2019-09-03

## (undated) RX ORDER — GLYCOPYRROLATE 0.2 MG/ML
INJECTION, SOLUTION INTRAMUSCULAR; INTRAVENOUS
Status: DISPENSED
Start: 2019-09-03

## (undated) RX ORDER — FENTANYL CITRATE 50 UG/ML
INJECTION, SOLUTION INTRAMUSCULAR; INTRAVENOUS
Status: DISPENSED
Start: 2019-09-03

## (undated) RX ORDER — DEXAMETHASONE SODIUM PHOSPHATE 4 MG/ML
INJECTION, SOLUTION INTRA-ARTICULAR; INTRALESIONAL; INTRAMUSCULAR; INTRAVENOUS; SOFT TISSUE
Status: DISPENSED
Start: 2019-09-03

## (undated) RX ORDER — PROPOFOL 10 MG/ML
INJECTION, EMULSION INTRAVENOUS
Status: DISPENSED
Start: 2019-09-03